# Patient Record
Sex: MALE | Race: OTHER | NOT HISPANIC OR LATINO | URBAN - METROPOLITAN AREA
[De-identification: names, ages, dates, MRNs, and addresses within clinical notes are randomized per-mention and may not be internally consistent; named-entity substitution may affect disease eponyms.]

---

## 2022-09-26 ENCOUNTER — INPATIENT (INPATIENT)
Facility: HOSPITAL | Age: 72
LOS: 3 days | Discharge: HOME | End: 2022-09-30
Attending: HOSPITALIST | Admitting: HOSPITALIST

## 2022-09-26 VITALS
DIASTOLIC BLOOD PRESSURE: 54 MMHG | RESPIRATION RATE: 18 BRPM | OXYGEN SATURATION: 99 % | SYSTOLIC BLOOD PRESSURE: 112 MMHG | HEART RATE: 96 BPM | TEMPERATURE: 98 F

## 2022-09-26 LAB
ALBUMIN SERPL ELPH-MCNC: 4.4 G/DL — SIGNIFICANT CHANGE UP (ref 3.3–5)
ALP SERPL-CCNC: 59 U/L — SIGNIFICANT CHANGE UP (ref 40–120)
ALT FLD-CCNC: 42 U/L — HIGH (ref 4–41)
ANION GAP SERPL CALC-SCNC: 15 MMOL/L — HIGH (ref 7–14)
APPEARANCE UR: CLEAR — SIGNIFICANT CHANGE UP
AST SERPL-CCNC: 32 U/L — SIGNIFICANT CHANGE UP (ref 4–40)
BASOPHILS # BLD AUTO: 0.01 K/UL — SIGNIFICANT CHANGE UP (ref 0–0.2)
BASOPHILS NFR BLD AUTO: 0.1 % — SIGNIFICANT CHANGE UP (ref 0–2)
BILIRUB SERPL-MCNC: 0.2 MG/DL — SIGNIFICANT CHANGE UP (ref 0.2–1.2)
BILIRUB UR-MCNC: NEGATIVE — SIGNIFICANT CHANGE UP
BUN SERPL-MCNC: 12 MG/DL — SIGNIFICANT CHANGE UP (ref 7–23)
CALCIUM SERPL-MCNC: 9.6 MG/DL — SIGNIFICANT CHANGE UP (ref 8.4–10.5)
CHLORIDE SERPL-SCNC: 96 MMOL/L — LOW (ref 98–107)
CO2 SERPL-SCNC: 23 MMOL/L — SIGNIFICANT CHANGE UP (ref 22–31)
COLOR SPEC: COLORLESS — SIGNIFICANT CHANGE UP
CREAT SERPL-MCNC: 0.9 MG/DL — SIGNIFICANT CHANGE UP (ref 0.5–1.3)
DIFF PNL FLD: NEGATIVE — SIGNIFICANT CHANGE UP
EGFR: 92 ML/MIN/1.73M2 — SIGNIFICANT CHANGE UP
EOSINOPHIL # BLD AUTO: 0 K/UL — SIGNIFICANT CHANGE UP (ref 0–0.5)
EOSINOPHIL NFR BLD AUTO: 0 % — SIGNIFICANT CHANGE UP (ref 0–6)
GLUCOSE SERPL-MCNC: 156 MG/DL — HIGH (ref 70–99)
GLUCOSE UR QL: NEGATIVE — SIGNIFICANT CHANGE UP
HCT VFR BLD CALC: 32.8 % — LOW (ref 39–50)
HGB BLD-MCNC: 10.8 G/DL — LOW (ref 13–17)
IANC: 9.71 K/UL — HIGH (ref 1.8–7.4)
IMM GRANULOCYTES NFR BLD AUTO: 1.7 % — HIGH (ref 0–0.9)
KETONES UR-MCNC: NEGATIVE — SIGNIFICANT CHANGE UP
LEUKOCYTE ESTERASE UR-ACNC: NEGATIVE — SIGNIFICANT CHANGE UP
LYMPHOCYTES # BLD AUTO: 1.51 K/UL — SIGNIFICANT CHANGE UP (ref 1–3.3)
LYMPHOCYTES # BLD AUTO: 12.7 % — LOW (ref 13–44)
MCHC RBC-ENTMCNC: 30 PG — SIGNIFICANT CHANGE UP (ref 27–34)
MCHC RBC-ENTMCNC: 32.9 GM/DL — SIGNIFICANT CHANGE UP (ref 32–36)
MCV RBC AUTO: 91.1 FL — SIGNIFICANT CHANGE UP (ref 80–100)
MONOCYTES # BLD AUTO: 0.45 K/UL — SIGNIFICANT CHANGE UP (ref 0–0.9)
MONOCYTES NFR BLD AUTO: 3.8 % — SIGNIFICANT CHANGE UP (ref 2–14)
NEUTROPHILS # BLD AUTO: 9.71 K/UL — HIGH (ref 1.8–7.4)
NEUTROPHILS NFR BLD AUTO: 81.7 % — HIGH (ref 43–77)
NITRITE UR-MCNC: NEGATIVE — SIGNIFICANT CHANGE UP
NRBC # BLD: 0 /100 WBCS — SIGNIFICANT CHANGE UP (ref 0–0)
NRBC # FLD: 0 K/UL — SIGNIFICANT CHANGE UP (ref 0–0)
PH UR: 6.5 — SIGNIFICANT CHANGE UP (ref 5–8)
PLATELET # BLD AUTO: 396 K/UL — SIGNIFICANT CHANGE UP (ref 150–400)
POTASSIUM SERPL-MCNC: 3.9 MMOL/L — SIGNIFICANT CHANGE UP (ref 3.5–5.3)
POTASSIUM SERPL-SCNC: 3.9 MMOL/L — SIGNIFICANT CHANGE UP (ref 3.5–5.3)
PROT SERPL-MCNC: 7.8 G/DL — SIGNIFICANT CHANGE UP (ref 6–8.3)
PROT UR-MCNC: NEGATIVE — SIGNIFICANT CHANGE UP
RBC # BLD: 3.6 M/UL — LOW (ref 4.2–5.8)
RBC # FLD: 14.6 % — HIGH (ref 10.3–14.5)
RBC CASTS # UR COMP ASSIST: SIGNIFICANT CHANGE UP /HPF (ref 0–4)
SODIUM SERPL-SCNC: 134 MMOL/L — LOW (ref 135–145)
SP GR SPEC: 1.01 — LOW (ref 1.01–1.05)
UROBILINOGEN FLD QL: SIGNIFICANT CHANGE UP
WBC # BLD: 11.88 K/UL — HIGH (ref 3.8–10.5)
WBC # FLD AUTO: 11.88 K/UL — HIGH (ref 3.8–10.5)
WBC UR QL: SIGNIFICANT CHANGE UP /HPF (ref 0–5)

## 2022-09-26 PROCEDURE — 74177 CT ABD & PELVIS W/CONTRAST: CPT | Mod: 26,MA

## 2022-09-26 PROCEDURE — 93010 ELECTROCARDIOGRAM REPORT: CPT

## 2022-09-26 PROCEDURE — 99285 EMERGENCY DEPT VISIT HI MDM: CPT | Mod: 25,GC

## 2022-09-26 NOTE — ED PROVIDER NOTE - OBJECTIVE STATEMENT
71 yo M hx of HTN, HLD, arrived from [facility] 10 days ago, pw weight loss (~18kg in the past month) and left flank pain x 1 month. Per CT report, patient has left renal cyst, unclear if infected based previous CT scan report done in August 20, 2022. Pt also endorse decreased urine ouput and "more yellow urine". Pt is concerned he has renal cancer. Denies GI symptoms, fever chills, dysuria. Of note, pt seen at [facility] on a few days ago and is being treated for PNA.

## 2022-09-26 NOTE — ED PROVIDER NOTE - PROGRESS NOTE DETAILS
PAYAL Mccall; Pt with L renal mass, possible malignancy, possibly infectious though less likely, Urology consulted, will assess.

## 2022-09-26 NOTE — ED PROVIDER NOTE - NS ED ROS FT
GENERAL: No fever, chills + weight loss unintentional.   EYES: no vision changes, no discharge.   ENT: no difficulty swallowing or speaking   CARDIAC: no chest pain/pressure, SOB, lower extremity swelling  PULMONARY: no cough, SOB  GI: no abdominal pain, n/v/d  : no dysuria, no hematuria  SKIN: no rashes, no ecchymosis  NEURO: no headache, lightheadedness  MSK: No joint pain, myalgia, weakness.

## 2022-09-26 NOTE — ED PROVIDER NOTE - CLINICAL SUMMARY MEDICAL DECISION MAKING FREE TEXT BOX
73 yo M hx of HTN, HLD, arrived from [facility] 10 days ago, pw unintentional weight loss and possible renal mass/cyst/ infection on CT done in [facility] in August. Well appearing on exam, no CVA ttp. Obtain labs, and CT to r/o infection/ abscess/ mass/ malignancy. Anticipate outpt followup for furhterworkup.

## 2022-09-26 NOTE — ED ADULT NURSE NOTE - OBJECTIVE STATEMENT
Patient received to room 17. Patient is a&ox4 ambulatory patient complaining of flank pain with no radiaition. patient denies chest pain sob n.Skyler granados has a IV in acon monitor awaiting for results.

## 2022-09-26 NOTE — ED ADULT NURSE NOTE - NSIMPLEMENTINTERV_GEN_ALL_ED
Implemented All Fall Risk Interventions:  [place] to call system. Call bell, personal items and telephone within reach. Instruct patient to call for assistance. Room bathroom lighting operational. Non-slip footwear when patient is off stretcher. Physically safe environment: no spills, clutter or unnecessary equipment. Stretcher in lowest position, wheels locked, appropriate side rails in place. Provide visual cue, wrist band, yellow gown, etc. Monitor gait and stability. Monitor for mental status changes and reorient to person, place, and time. Review medications for side effects contributing to fall risk. Reinforce activity limits and safety measures with patient and family.

## 2022-09-26 NOTE — ED PROVIDER NOTE - ATTENDING CONTRIBUTION TO CARE
Brief HPI:  71 yo M hx of HTN, HLD, arrived from [facility] 10 days ago, pw weight loss (~18kg in the past month) and left flank pain x 1 month.  Patient recently traveled from [facility].  States he was evaluated there for left flank pain with CT scan (has report from 8/4/22) showing left sided complex renal cyst, consistent with possible infected cyst.  Patient presents here for persistent pain and to establish care with specialist.  Denies fever, chills, hematuria, nausea, vomiting.  Reports dysuria.     Vitals:   Reviewed    Exam:    GEN:  Non-toxic appearing, non-distressed, speaking full sentences, non-diaphoretic, AAOx3  HEENT:  NCAT, neck supple, EOMI, PERRLA, sclera anicteric, no conjunctival pallor or injection, no stridor, normal voice, no tonsillar exudate, uvula midline  CV:  regular rhythm and rate, s1/s2 audible, no murmurs, rubs or gallops, peripheral pulses 2+ and symmetric  PULM:  non-labored respirations, lungs clear to auscultation bilaterally, no wheezes, crackles or rales  ABD:  non distended, non-tender, no rebound, no guarding, negative Zaragoza's sign, bowel sounds normal, no cvat  MSK:  no gross deformity, non-tender extremities and joints, range of motion grossly normal appearing, no extremity edema, extremities warm and well perfused   NEURO:  AAOx3, CN II-XII intact, motor 5/5 in upper and lower extremities bilaterally, sensation grossly intact in extremities and trunk, finger to nose testing wnl, no nystagmus, negative Romberg, no pronator drift, no gait deficit  SKIN:  warm, dry, no rash or vesicles     A/P:  71 yo M hx of HTN, HLD, arrived from [facility] 10 days ago, pw weight loss (~18kg in the past month) and left flank pain x 1 month.  VSS.  Will send labs, ua, ctap to evaluate left sided renal abnormality.  Dispo pending.

## 2022-09-27 DIAGNOSIS — D72.829 ELEVATED WHITE BLOOD CELL COUNT, UNSPECIFIED: ICD-10-CM

## 2022-09-27 DIAGNOSIS — N28.1 CYST OF KIDNEY, ACQUIRED: ICD-10-CM

## 2022-09-27 DIAGNOSIS — R55 SYNCOPE AND COLLAPSE: ICD-10-CM

## 2022-09-27 DIAGNOSIS — Z29.9 ENCOUNTER FOR PROPHYLACTIC MEASURES, UNSPECIFIED: ICD-10-CM

## 2022-09-27 DIAGNOSIS — E87.1 HYPO-OSMOLALITY AND HYPONATREMIA: ICD-10-CM

## 2022-09-27 DIAGNOSIS — Z78.9 OTHER SPECIFIED HEALTH STATUS: Chronic | ICD-10-CM

## 2022-09-27 DIAGNOSIS — I10 ESSENTIAL (PRIMARY) HYPERTENSION: ICD-10-CM

## 2022-09-27 DIAGNOSIS — E87.8 OTHER DISORDERS OF ELECTROLYTE AND FLUID BALANCE, NOT ELSEWHERE CLASSIFIED: ICD-10-CM

## 2022-09-27 DIAGNOSIS — E11.9 TYPE 2 DIABETES MELLITUS WITHOUT COMPLICATIONS: ICD-10-CM

## 2022-09-27 LAB
FLUAV AG NPH QL: SIGNIFICANT CHANGE UP
FLUBV AG NPH QL: SIGNIFICANT CHANGE UP
RSV RNA NPH QL NAA+NON-PROBE: SIGNIFICANT CHANGE UP
SARS-COV-2 RNA SPEC QL NAA+PROBE: SIGNIFICANT CHANGE UP
TROPONIN T, HIGH SENSITIVITY RESULT: 12 NG/L — SIGNIFICANT CHANGE UP
TSH SERPL-MCNC: 3.19 UIU/ML — SIGNIFICANT CHANGE UP (ref 0.27–4.2)

## 2022-09-27 PROCEDURE — 99223 1ST HOSP IP/OBS HIGH 75: CPT | Mod: FS

## 2022-09-27 RX ORDER — LOSARTAN POTASSIUM 100 MG/1
50 TABLET, FILM COATED ORAL DAILY
Refills: 0 | Status: DISCONTINUED | OUTPATIENT
Start: 2022-09-27 | End: 2022-09-30

## 2022-09-27 RX ORDER — ALBUTEROL 90 UG/1
2 AEROSOL, METERED ORAL EVERY 6 HOURS
Refills: 0 | Status: DISCONTINUED | OUTPATIENT
Start: 2022-09-27 | End: 2022-09-30

## 2022-09-27 RX ORDER — INSULIN LISPRO 100/ML
VIAL (ML) SUBCUTANEOUS AT BEDTIME
Refills: 0 | Status: DISCONTINUED | OUTPATIENT
Start: 2022-09-27 | End: 2022-09-30

## 2022-09-27 RX ORDER — DEXTROSE 50 % IN WATER 50 %
15 SYRINGE (ML) INTRAVENOUS ONCE
Refills: 0 | Status: DISCONTINUED | OUTPATIENT
Start: 2022-09-27 | End: 2022-09-30

## 2022-09-27 RX ORDER — DEXTROSE 50 % IN WATER 50 %
25 SYRINGE (ML) INTRAVENOUS ONCE
Refills: 0 | Status: DISCONTINUED | OUTPATIENT
Start: 2022-09-27 | End: 2022-09-30

## 2022-09-27 RX ORDER — INSULIN LISPRO 100/ML
VIAL (ML) SUBCUTANEOUS
Refills: 0 | Status: DISCONTINUED | OUTPATIENT
Start: 2022-09-27 | End: 2022-09-30

## 2022-09-27 RX ORDER — ENOXAPARIN SODIUM 100 MG/ML
40 INJECTION SUBCUTANEOUS EVERY 24 HOURS
Refills: 0 | Status: DISCONTINUED | OUTPATIENT
Start: 2022-09-27 | End: 2022-09-30

## 2022-09-27 RX ORDER — PANTOPRAZOLE SODIUM 20 MG/1
40 TABLET, DELAYED RELEASE ORAL
Refills: 0 | Status: DISCONTINUED | OUTPATIENT
Start: 2022-09-27 | End: 2022-09-30

## 2022-09-27 RX ORDER — DEXTROSE 50 % IN WATER 50 %
12.5 SYRINGE (ML) INTRAVENOUS ONCE
Refills: 0 | Status: DISCONTINUED | OUTPATIENT
Start: 2022-09-27 | End: 2022-09-30

## 2022-09-27 RX ORDER — SODIUM CHLORIDE 9 MG/ML
1000 INJECTION, SOLUTION INTRAVENOUS
Refills: 0 | Status: DISCONTINUED | OUTPATIENT
Start: 2022-09-27 | End: 2022-09-30

## 2022-09-27 RX ORDER — GLUCAGON INJECTION, SOLUTION 0.5 MG/.1ML
1 INJECTION, SOLUTION SUBCUTANEOUS ONCE
Refills: 0 | Status: DISCONTINUED | OUTPATIENT
Start: 2022-09-27 | End: 2022-09-30

## 2022-09-27 RX ORDER — LANOLIN ALCOHOL/MO/W.PET/CERES
3 CREAM (GRAM) TOPICAL AT BEDTIME
Refills: 0 | Status: DISCONTINUED | OUTPATIENT
Start: 2022-09-27 | End: 2022-09-30

## 2022-09-27 RX ORDER — MONTELUKAST 4 MG/1
10 TABLET, CHEWABLE ORAL DAILY
Refills: 0 | Status: DISCONTINUED | OUTPATIENT
Start: 2022-09-27 | End: 2022-09-30

## 2022-09-27 RX ORDER — ACETAMINOPHEN 500 MG
650 TABLET ORAL EVERY 6 HOURS
Refills: 0 | Status: DISCONTINUED | OUTPATIENT
Start: 2022-09-27 | End: 2022-09-30

## 2022-09-27 NOTE — H&P ADULT - NSICDXFAMILYHX_GEN_ALL_CORE_FT
FAMILY HISTORY:  Family history of cancer in sister, Lung CA -   Family history of diabetes mellitus in father

## 2022-09-27 NOTE — H&P ADULT - NS ATTEND AMEND GEN_ALL_CORE FT
72M with HTN, DM, known cystic kidney lesion, recently moved from [facility] presenting for evaluation of renal cyst, also reported a sycopal episode several days before admission.    Evaluated by urology who recommend MRI inpatient vs outpatient and outpatient follow-up.    For syncope - no prodrome, on exam no appreciable murmur, EKG unremarkable. Plan for telemetry, TTE, orthostatics    Recent admission for CAP, completed course of therapy and so will dc abx

## 2022-09-27 NOTE — H&P ADULT - NEGATIVE ENMT SYMPTOMS
no hearing difficulty/no ear pain/no tinnitus/no vertigo/no sinus symptoms/no nasal congestion/no abnormal taste sensation

## 2022-09-27 NOTE — H&P ADULT - GAIT/BALANCE
ambulated the patient 50 ft while attempting to weight the patient, ambulation was balanced no need for assistance, patient is slow to start but fine after

## 2022-09-27 NOTE — H&P ADULT - HISTORY OF PRESENT ILLNESS
72y Male PMHx HTN, HLD, DM, asthma presenting to [facility] ED for syncopal event and flank pain. He reports a 1 to 2 week episodes of dizziness that comes and goes and make it feel like he is spinning, states he has recently had reduced PO intake of food and water, feels that sitting down helps the dizziness and moving around too much makes the dizziness more apparent. On Sunday he reports an episode of 2 to 4 min LOC, with no pre syncopal symptoms, the patient was standing near the window and feel back on a carpeted floor hitting the back of his head, without an bruising or bleeding noted, he also denies vomiting or post fall confusion. He also reports a 1 month history of flank pain, non radiating 5/10 on pain scale, described as a sharp pinching that comes and goes. Additionally he reports a 2 month history of reduced urine output but increased frequency, he states that he feels he doesn't produce as much urine as baseline but has noted increased frequency and the color is a light yellow, he feels that he hasn't consumed enough fluids recently and attributes it to that. He also reports a 15kg weight loss and has a recently travel history significant for a migration from [facility] 10 days ago. Otherwise denies fever, headache, numbess/tingling, photophobia, difficulty hearing, orthopnea, chest pain, palpations, melena, hematochezia, hematuria.  72y Male PMHx HTN, HLD, DM, asthma presenting to [facility] ED for syncopal event and flank pain. He reports a 1 to 2 week episodes of dizziness that comes and goes and make it feel like he is spinning, states he has recently had reduced PO intake of food and water, feels that sitting down helps the dizziness and moving around too much makes the dizziness more apparent. On Sunday he reports an episode of 2 to 4 min LOC, with shortness of breath prior to event, the patient was standing near the window and feel back on a carpeted floor hitting the back of his head, without an bruising or bleeding noted, he also denies vomiting or post fall confusion. He also reports a 1 month history of flank pain, non radiating 5/10 on pain scale, described as a sharp pinching that comes and goes. Additionally he reports a 2 month history of reduced urine output but increased frequency, he states that he feels he doesn't produce as much urine as baseline but has noted increased frequency and the color is a light yellow, he feels that he hasn't consumed enough fluids recently and attributes it to that. He also reports a 15kg weight loss and has a recently travel history significant for a migration from [facility] 10 days ago. Otherwise denies fever, headache, numbess/tingling, photophobia, difficulty hearing, orthopnea, chest pain, palpations, melena, hematochezia, hematuria, urinary incontinence, tremor or shaking, tongue biting.     Of note on 9/22 he went to [facility] ER and was evaluted for cough/sob and was found to have a pneumonia; prescribed prednisone, Augmentin and azithromycin.

## 2022-09-27 NOTE — H&P ADULT - NEGATIVE OPHTHALMOLOGIC SYMPTOMS
no diplopia/no photophobia/no blurred vision L/no blurred vision R/no irritation L/no irritation R/no loss of vision L/no loss of vision R

## 2022-09-27 NOTE — H&P ADULT - RESPIRATORY
clear to auscultation bilaterally/no wheezes/no rales/no rhonchi/no respiratory distress/no use of accessory muscles/airway patent/breath sounds equal/good air movement

## 2022-09-27 NOTE — H&P ADULT - PROBLEM SELECTOR PLAN 1
-admitted to medicine: For near syncope/complex renal mass  - reported 1 to 2 week dizzy spells and 2-4 LOC on sunday, no hematoma, didn't bite lip or urinate on themselves, standing fell to carpeted ground hitting the back of his head - Admit to medicine on telemetry  - Patient with syncopal event with questionable duration of LOC that was preceded by SOB.   - EKG Non-ischemic appearing and troponin negative  - Patient without tachycardia or hypoxia though had recent flight - doubt PE  - Will check orthostatics and echocardiogram

## 2022-09-27 NOTE — H&P ADULT - PROBLEM SELECTOR PLAN 4
- Prophylaxis: Lovenox 40 (patient had recently been on long flight) - Very mild hyponatremia likely mixed picture of hyperglycemia and volume 2/2 pneumonia.   - Monitor BMP

## 2022-09-27 NOTE — ED ADULT NURSE REASSESSMENT NOTE - NS ED NURSE REASSESS COMMENT FT1
received report form night RN deepak. VSS, denies chest pain and SOB, RR even and unlabored. pt denies pain at this time. will continue to monitor.

## 2022-09-27 NOTE — H&P ADULT - PROBLEM SELECTOR PLAN 6
- Awaiting pharmcy aid in transalating patients medications  - For now can start losartan 50  - Dash diet

## 2022-09-27 NOTE — H&P ADULT - NSHPLABSRESULTS_GEN_ALL_CORE
10.8   11.88 )-----------( 396      ( 26 Sep 2022 17:36 )             32.8           134<L>  |  96<L>  |  12  ----------------------------<  156<H>  3.9   |  23  |  0.90    Ca    9.6      26 Sep 2022 17:36    TPro  7.8  /  Alb  4.4  /  TBili  0.2  /  DBili  x   /  AST  32  /  ALT  42<H>  /  AlkPhos  59      Urinalysis Basic - ( 26 Sep 2022 21:10 )    Color: Colorless / Appearance: Clear / S.008 / pH: x  Gluc: x / Ketone: Negative  / Bili: Negative / Urobili: <2 mg/dL   Blood: x / Protein: Negative / Nitrite: Negative   Leuk Esterase: Negative / RBC: NA /HPF / WBC NA /HPF   Sq Epi: x / Non Sq Epi: x / Bacteria: x    CT A/P: 1. 5 cm complex cystic lesion in the lower pole left kidney,   indeterminate. MRI with contrast would be helpful to definitively   characterize this cystic mass.  2. Tree-in-bud opacities in the right lower lobe, in keeping with   patient's history of recently diagnosed pneumonia.  3. Mild aneurysmal dilatation of the distal descending aorta above the hiatus, measures up to 3.8 cm.

## 2022-09-27 NOTE — H&P ADULT - ASSESSMENT
72y Male PMHx HTN, HLD, DM, asthma presenting to [facility] ED for syncopal event and flank pain, now admitted to medicine for eval of left kidney mass and syncopal event.

## 2022-09-27 NOTE — PHARMACOTHERAPY INTERVENTION NOTE - COMMENTS
Medication history is incomplete. Medication list updated in Outpatient Medication Record (OMR) per [facility].     International medications from patient’s applicable medical records:    Prescription note dated 6/21/22  Provair 10mg = Montelukast 10mg (formulary)  Cefaclav 500mg = Cefuroxime axetil 500mg + Clavulanic acid 125mg  Not available as combination in [place], cefuroxime axetil alone 500mg tablet on formulary (may no longer be necessary)  Predixa 16mg = Methylprednisolone 16mg (formulary)  Pantonix 40mg = Pantoprazole 40mg (formulary)  Doxiva 200mg = Doxophylline 200mg  Not available in the [place], however this is a theophylline derivative; theophylline on formulary   Dilbro UniCap = Indacaterol (long-acting beta-2 agonist) + Glycopyrronium (long-acting anticholinergic)  Not available in [place], formulary recommendations for LABA + Anticholinergic include Bevespi Aerosphere or Stiolto Respirmat  Bexitrol F 25/250 = Salmeterol (long-acting beta-2 agonist) + Fluticasone propionate (inhaled corticosteroid)  Therapeutic duplication of long-acting beta-2 agonist class with item #6  Not available in [place], formulary recommendations for LABA + inhaled corticosteroid + Anticholinergic include Bevespi Aerosphere OR Stiolto Respirmat + Asmanex OR Pulmicort  Azmasol HFA = international brand name for albuterol HFA (formulary)  Short acting beta-agonist  Fluvate nasal spray = Fluticasone nasal spray 50mcg/spray (formulary)  Banocide 50mg = Diethylcarbamazine 50mg  Not available in [place], this is an anthelmintic medication for treating parasitic worm infections and filariasis. Patient may no longer need this.    Medical note dated 6/21/22   Siglimet 50-500mg once in the morning = combination tablet Sitagliptin 50mg + Metformin 500mg  Oral antidiabetics not used inpatient, use basal/bolus insulin regimen  Comet 850mg once in the evening = Metformin 850mg  Oral antidiabetics not used inpatient, use basal/bolus insulin regimen  Telmipres 40mg once daily = Telmisartan 40mg   Therapeutic interchange to losartan 50mg orally once daily  Prelica 50mg orally once at bedtime = Pregabalin 50mg (formulary)  D-Vine 40,000 units (frequency not indicated) = Vitamin D3 capsule 40,000 units  Bilastin 20mg orally 2 times a day = bilastine 20mg  Not available in the [place]; this is an  antihistamine, loratadine (non-drowsy) or diphenhydramine (administer at bedtime) on formulary  Zif-forte = Vitamin B complete with iron, folic acid, and zinc  Dexilend 30mg orally 2 times a day = dexlansoprazole 30mg  Therapeutic duplication with item #4 in Prescription note dated 6/21/22, above, pantoprazole on formulary  Flutisal 250 = Salmeterol (long-acting beta-2 agonist) + Fluticasone propionate (inhaled corticosteroid)  See item #7b from Prescription note dated 6/21/22, above, for formulary recommendation  Solas 100mg = Mebendazole 100mg   This is an broad-spectrum anthelmintic medication for treating parasitic worm infections and filariasis. Patient may no longer need this.    Medical note dated 8/16/22  Siglimet 50-500mg – see above  Empazim 10mg = Empagliflozin 10mg (Jardiance)  Oral antidiabetics not used inpatient, use basal/bolus insulin regimen  Temipres 40mg – see above  Degut (difficult to read hand-writing) = Domperidone 10mg (usually 3 times a day)  Not available in [place], this is a dopamine antagonist and prokinetic/gastrointestinal agent for GI motility disorders (diabetic gastroparesis, gastritis)  No available alternatives on formulary or in [place]  Xinc B = Vitamin B complex with zinc    Please call spectra [phone] if you have any questions.  Medication history is incomplete. Medication list updated in Outpatient Medication Record (OMR) per [facility].     International medications from patient’s applicable medical records:    Prescription note dated 6/21/22  Provair 10mg = Montelukast 10mg (formulary)  Cefaclav 500mg = Cefuroxime axetil 500mg + Clavulanic acid 125mg  Not available as combination in [place], cefuroxime axetil alone 500mg tablet on formulary (may no longer be necessary)  Predixa 16mg = Methylprednisolone 16mg (formulary)  Pantonix 40mg = Pantoprazole 40mg (formulary)  Doxiva 200mg = Doxophylline 200mg  Not available in the [place], however this is a theophylline derivative; theophylline on formulary   Dilbro UniCap = Indacaterol (long-acting beta-2 agonist) + Glycopyrronium (long-acting anticholinergic)  Not available in [place], formulary recommendations for LABA + Anticholinergic include Bevespi Aerosphere or Stiolto Respirmat  Bexitrol F 25/250 = Salmeterol (long-acting beta-2 agonist) + Fluticasone propionate (inhaled corticosteroid)  Therapeutic duplication of long-acting beta-2 agonist class with item #6  Not available in [place], formulary recommendations for LABA + inhaled corticosteroid + Anticholinergic include Bevespi Aerosphere OR Stiolto Respirmat + Asmanex OR Pulmicort  Azmasol HFA = international brand name for albuterol HFA (formulary)  Short acting beta-agonist  Fluvate nasal spray = Fluticasone nasal spray 50mcg/spray (formulary)  Banocide 50mg = Diethylcarbamazine 50mg  Not available in [place], this is an anthelmintic medication for treating parasitic worm infections and filariasis. Patient may no longer need this.    Medical note dated 6/21/22   Siglimet 50-500mg once in the morning = combination tablet Sitagliptin 50mg + Metformin 500mg  Oral antidiabetics not used inpatient, use basal/bolus insulin regimen  Comet 850mg once in the evening = Metformin 850mg  Oral antidiabetics not used inpatient, use basal/bolus insulin regimen  Telmipres 40mg once daily = Telmisartan 40mg   Therapeutic interchange to losartan 50mg orally once daily  Prelica 50mg orally once at bedtime = Pregabalin 50mg (formulary)  D-Vine 40,000 units (frequency not indicated) = Vitamin D3 capsule 40,000 units  Bilastin 20mg orally 2 times a day = bilastine 20mg  Not available in the [place]; this is an  antihistamine, loratadine (non-drowsy) or diphenhydramine (administer at bedtime) on formulary  Zif-forte = Vitamin B complete with iron, folic acid, and zinc  Dexilend 30mg orally 2 times a day = dexlansoprazole 30mg  Therapeutic duplication with item #4 in Prescription note dated 6/21/22, above, pantoprazole on formulary  Flutisal 250 = Salmeterol (long-acting beta-2 agonist) + Fluticasone propionate (inhaled corticosteroid)  See item #7b from Prescription note dated 6/21/22, above, for formulary recommendation  Solas 100mg = Mebendazole 100mg   This is an broad-spectrum anthelmintic medication for treating parasitic worm infections and filariasis. Patient may no longer need this.    Medical note dated 8/16/22  Siglimet 50-500mg – see above  Empazim 10mg = Empagliflozin 10mg (Jardiance)  Oral antidiabetics not used inpatient, use basal/bolus insulin regimen  Temipres 40mg – see above  Degut (difficult to read hand-writing) = Domperidone 10mg (usually 3 times a day)  Not available in [place], this is a dopamine antagonist and prokinetic/gastrointestinal agent for GI motility disorders (diabetic gastroparesis, gastritis)  No available alternatives on formulary or in [place]  Xinc B = Vitamin B complex with zinc    Please call spectra [phone] if you have any questions.  Spoke with ACP provider to notify OMR updated.

## 2022-09-27 NOTE — H&P ADULT - PROBLEM SELECTOR PLAN 3
- recent diagnosis of CAP @ [facility] on 9/22  - being treated with Augmentin, Azithromycin, Prednisone   - As per [facility] d/c paper work WBC 13.05 on 9/22  - Currently 11.88 (resolving)  - continue to monitor with CBC  - start D5W 100cc/hr - recent diagnosis of CAP @ [facility] on 9/22  - being treated with Augmentin, Azithromycin, Prednisone   - As per [facility] d/c paper work WBC 13.05 on 9/22  - Currently 11.88 (resolving)  - continue to monitor with CBC  -  Complete course of Augmentin through 10/1 as previously prescribed. - recent diagnosis of CAP @ [facility] on 9/22  - being treated with Augmentin, Azithromycin, Prednisone   - As per [facility] d/c paper work WBC 13.05 on 9/22  - Currently 11.88 (resolving)  - continue to monitor with CBC  - d/c augmentin given has already completed >5 days for CAP

## 2022-09-27 NOTE — CONSULT NOTE ADULT - ASSESSMENT
71 y/o male from [facility] presented with left flank pain x 1 month and weight loss of 15kg, with no fevers, dysuria or hematuria. CT findings show 5cm complex cystic lesion in lower pole of left kidney.

## 2022-09-27 NOTE — CONSULT NOTE ADULT - SUBJECTIVE AND OBJECTIVE BOX
HPI  73 y/o male w/ PMHx of HTN, DM, Asthma, presents to ED with left sided flank pain x 1 month. He describes the pain as intermittent and sharp, relieved with resting. Patient reports temperature of 99-100F. Patient reports a syncopal episode with LOC on . He admits to dizziness and weakness. He denies headache, CP, dyspnea, n/v, dysuria or hematuria.    Of note, patient moved from [facility] on 22. Due to weight loss of 15kg, weakness, and decreased urine output, patient had CT scan on 08/10/22, which was significant for high density fluid-filled lesion (4.5 x 3.5 x 4.5 cm) in left kidney; possibly infected cyst, possible abscess.  No intervention was indicated at that time, as [place] was coming [place].      PAST MEDICAL & SURGICAL HISTORY:  HTN  DM  Asthma    MEDICATIONS  (STANDING):   Comet 500mg   Telmipress 40mg    MEDICATIONS  (PRN):      FAMILY HISTORY:  Sister - Lung cancer  Mom - DM      Allergies    No Known Allergies    Intolerances        SOCIAL HISTORY:   Quit smoking 15 yrs ago, smoked 3 PPD x 35yrs    REVIEW OF SYSTEMS: Otherwise negative as stated in HPI    Physical Exam  Vital signs  T(F): 98 (22 @ 05:00), Max: 98.3 (22 @ 00:55)  HR: 69 (22 @ 05:00)  BP: 134/62 (22 @ 05:00)  SpO2: 98% (22 @ 05:00)    Output    UOP    Gen:  [X] NAD [] toxic    Pulm:  [X] no resp distress [X] no substernal retractions  	  CV:  [X] RRR    GI:  [X] Soft [X] ND [X] NT  No CVA tenderness bilaterally                        	  MSK:  Edema []Y [X]N    LABS:       @ 17:36    WBC 11.88 / Hct 32.8  / SCr 0.90       Urinalysis Basic - ( 26 Sep 2022 21:10 )    Color: Colorless / Appearance: Clear / S.008 / pH: x  Gluc: x / Ketone: Negative  / Bili: Negative / Urobili: <2 mg/dL   Blood: x / Protein: Negative / Nitrite: Negative   Leuk Esterase: Negative / RBC: NA /HPF / WBC NA /HPF   Sq Epi: x / Non Sq Epi: x / Bacteria: x        Urine Cx: P  Blood Cx:    RADIOLOGY:     < from: CT Abdomen and Pelvis w/ IV Cont (22 @ 23:39) >    ACC: [id number] EXAM:  CT ABDOMEN AND PELVIS IC                          PROCEDURE DATE:  2022          INTERPRETATION:  CLINICAL INFORMATION: Unintentional weight loss, left   flank pain for one month, currently on antibiotics for pneumonia.    COMPARISON: None.    CONTRAST/COMPLICATIONS:  IV Contrast: Omnipaque 350  62 cc administered   8 cc discarded  Oral Contrast: NONE  Complications: None reported at time of study completion    PROCEDURE:  CT of the Abdomen and Pelvis was performed.  Sagittal and coronal reformats were performed.    FINDINGS:  LOWER CHEST: Subtle tree-in-bud infiltrate in the right lower lobe. No   effusion. Distal descending thoracic aorta is focally aneurysmal   measuring 3.8 x 3.6 cm above the hiatus.    LIVER:Hepatic steatosis with some areas of sparing peripherally  BILE DUCTS: Normal caliber.  GALLBLADDER: Within normal limits.  SPLEEN: Within normal limits.  PANCREAS: Within normal limits.  ADRENALS: Within normal limits.  KIDNEYS/URETERS: 5 cm mildly complex cystic lesion in the lower pole left   kidney, extends into the region of the lower pole calyx, with several   punctate peripheral calcifications. Mild fat stranding along the inferior   margin. No hydronephrosis or hydroureter.    BLADDER: Unremarkable  REPRODUCTIVE ORGANS: Prostate is normal in size    BOWEL: No bowel obstruction. Normal appendix.Colonic diverticulosis  PERITONEUM: No free air or free fluid.  VESSELS: Atherosclerotic changes.  RETROPERITONEUM/LYMPH NODES: No enlarged lymph nodes measuring greater   than 10 mm in short axis  ABDOMINAL WALL: Unremarkable  BONES: No destructive osseous lesion    IMPRESSION:    1. 5 cm complex cystic lesion in the lower pole left kidney,   indeterminate. MRI with contrast would be helpful to definitively   characterize this cystic mass.  2. Tree-in-bud opacities in the right lower lobe, in keeping with   patient's history of recently diagnosed pneumonia.  3. Mild aneurysmal dilatation of the distal descending aorta above the   hiatus, measures up to 3.8 cm.    < end of copied text >

## 2022-09-27 NOTE — H&P ADULT - PROBLEM SELECTOR PLAN 7
- Prophylaxis: Lovenox 40     Med-history pharmacist emailed for outpatient medication review as medications are international names.

## 2022-09-27 NOTE — H&P ADULT - GASTROINTESTINAL
soft/nontender/nondistended/normal active bowel sounds/no guarding/no rigidity/no organomegaly details…

## 2022-09-27 NOTE — H&P ADULT - PROBLEM SELECTOR PLAN 2
- CT A/P: 5cm Complex cystic lesion in the left lower pole of kidney, Mild aneurysmal dilation of the distal descending aorta above the hiatus   - UA negative, UC sent   - Consulted urology recommendations appreciated   - Pain management: acetaminophen  - MRI Abdomen ordered  - Follow up as outpatient on d/c - CT A/P: 5cm Complex cystic lesion in the left lower pole of kidney, Mild aneurysmal dilation of the distal descending aorta above the hiatus   - Consulted urology recommendations appreciated - further evaluation can be completed as outpatient and should not delay discharge.   - Pain management: acetaminophen  - MRI Abdomen ordered (safety sheet and consent obtained and placed in chart)  - Follow up as outpatient on d/c

## 2022-09-28 DIAGNOSIS — I71.4 ABDOMINAL AORTIC ANEURYSM, WITHOUT RUPTURE: ICD-10-CM

## 2022-09-28 DIAGNOSIS — E53.8 DEFICIENCY OF OTHER SPECIFIED B GROUP VITAMINS: ICD-10-CM

## 2022-09-28 LAB
A1C WITH ESTIMATED AVERAGE GLUCOSE RESULT: 6.7 % — HIGH (ref 4–5.6)
ANION GAP SERPL CALC-SCNC: 13 MMOL/L — SIGNIFICANT CHANGE UP (ref 7–14)
BUN SERPL-MCNC: 12 MG/DL — SIGNIFICANT CHANGE UP (ref 7–23)
CALCIUM SERPL-MCNC: 9 MG/DL — SIGNIFICANT CHANGE UP (ref 8.4–10.5)
CHLORIDE SERPL-SCNC: 100 MMOL/L — SIGNIFICANT CHANGE UP (ref 98–107)
CHOLEST SERPL-MCNC: 184 MG/DL — SIGNIFICANT CHANGE UP
CO2 SERPL-SCNC: 24 MMOL/L — SIGNIFICANT CHANGE UP (ref 22–31)
CREAT SERPL-MCNC: 0.77 MG/DL — SIGNIFICANT CHANGE UP (ref 0.5–1.3)
CULTURE RESULTS: NO GROWTH — SIGNIFICANT CHANGE UP
EGFR: 96 ML/MIN/1.73M2 — SIGNIFICANT CHANGE UP
ESTIMATED AVERAGE GLUCOSE: 146 — SIGNIFICANT CHANGE UP
FERRITIN SERPL-MCNC: 132 NG/ML — SIGNIFICANT CHANGE UP (ref 30–400)
FOLATE SERPL-MCNC: 9.4 NG/ML — SIGNIFICANT CHANGE UP (ref 3.1–17.5)
GLUCOSE BLDC GLUCOMTR-MCNC: 113 MG/DL — HIGH (ref 70–99)
GLUCOSE BLDC GLUCOMTR-MCNC: 123 MG/DL — HIGH (ref 70–99)
GLUCOSE BLDC GLUCOMTR-MCNC: 172 MG/DL — HIGH (ref 70–99)
GLUCOSE SERPL-MCNC: 102 MG/DL — HIGH (ref 70–99)
HCT VFR BLD CALC: 34.9 % — LOW (ref 39–50)
HCV AB S/CO SERPL IA: 0.13 S/CO — SIGNIFICANT CHANGE UP (ref 0–0.99)
HCV AB SERPL-IMP: SIGNIFICANT CHANGE UP
HDLC SERPL-MCNC: 33 MG/DL — LOW
HGB BLD-MCNC: 10.9 G/DL — LOW (ref 13–17)
IRON SATN MFR SERPL: 22 % — SIGNIFICANT CHANGE UP (ref 14–50)
IRON SATN MFR SERPL: 45 UG/DL — SIGNIFICANT CHANGE UP (ref 45–165)
LIPID PNL WITH DIRECT LDL SERPL: 93 MG/DL — SIGNIFICANT CHANGE UP
MAGNESIUM SERPL-MCNC: 1.7 MG/DL — SIGNIFICANT CHANGE UP (ref 1.6–2.6)
MCHC RBC-ENTMCNC: 29.1 PG — SIGNIFICANT CHANGE UP (ref 27–34)
MCHC RBC-ENTMCNC: 31.2 GM/DL — LOW (ref 32–36)
MCV RBC AUTO: 93.3 FL — SIGNIFICANT CHANGE UP (ref 80–100)
NON HDL CHOLESTEROL: 151 MG/DL — HIGH
NRBC # BLD: 0 /100 WBCS — SIGNIFICANT CHANGE UP (ref 0–0)
NRBC # FLD: 0 K/UL — SIGNIFICANT CHANGE UP (ref 0–0)
PHOSPHATE SERPL-MCNC: 4.1 MG/DL — SIGNIFICANT CHANGE UP (ref 2.5–4.5)
PLATELET # BLD AUTO: 373 K/UL — SIGNIFICANT CHANGE UP (ref 150–400)
POTASSIUM SERPL-MCNC: 3.8 MMOL/L — SIGNIFICANT CHANGE UP (ref 3.5–5.3)
POTASSIUM SERPL-SCNC: 3.8 MMOL/L — SIGNIFICANT CHANGE UP (ref 3.5–5.3)
RBC # BLD: 3.74 M/UL — LOW (ref 4.2–5.8)
RBC # FLD: 14.6 % — HIGH (ref 10.3–14.5)
SODIUM SERPL-SCNC: 137 MMOL/L — SIGNIFICANT CHANGE UP (ref 135–145)
SPECIMEN SOURCE: SIGNIFICANT CHANGE UP
TIBC SERPL-MCNC: 208 UG/DL — LOW (ref 220–430)
TRIGL SERPL-MCNC: 290 MG/DL — HIGH
UIBC SERPL-MCNC: 163 UG/DL — SIGNIFICANT CHANGE UP (ref 110–370)
VIT B12 SERPL-MCNC: 167 PG/ML — LOW (ref 200–900)
WBC # BLD: 10.79 K/UL — HIGH (ref 3.8–10.5)
WBC # FLD AUTO: 10.79 K/UL — HIGH (ref 3.8–10.5)

## 2022-09-28 PROCEDURE — 99233 SBSQ HOSP IP/OBS HIGH 50: CPT

## 2022-09-28 PROCEDURE — 74183 MRI ABD W/O CNTR FLWD CNTR: CPT | Mod: 26

## 2022-09-28 RX ORDER — BUDESONIDE AND FORMOTEROL FUMARATE DIHYDRATE 160; 4.5 UG/1; UG/1
2 AEROSOL RESPIRATORY (INHALATION)
Refills: 0 | Status: DISCONTINUED | OUTPATIENT
Start: 2022-09-28 | End: 2022-09-30

## 2022-09-28 RX ORDER — PREGABALIN 225 MG/1
1000 CAPSULE ORAL DAILY
Refills: 0 | Status: DISCONTINUED | OUTPATIENT
Start: 2022-09-28 | End: 2022-09-30

## 2022-09-28 RX ORDER — MAGNESIUM SULFATE 500 MG/ML
2 VIAL (ML) INJECTION ONCE
Refills: 0 | Status: COMPLETED | OUTPATIENT
Start: 2022-09-28 | End: 2022-09-28

## 2022-09-28 RX ORDER — INFLUENZA VIRUS VACCINE 15; 15; 15; 15 UG/.5ML; UG/.5ML; UG/.5ML; UG/.5ML
0.7 SUSPENSION INTRAMUSCULAR ONCE
Refills: 0 | Status: DISCONTINUED | OUTPATIENT
Start: 2022-09-28 | End: 2022-09-30

## 2022-09-28 RX ADMIN — Medication 25 GRAM(S): at 18:19

## 2022-09-28 RX ADMIN — PANTOPRAZOLE SODIUM 40 MILLIGRAM(S): 20 TABLET, DELAYED RELEASE ORAL at 06:32

## 2022-09-28 RX ADMIN — LOSARTAN POTASSIUM 50 MILLIGRAM(S): 100 TABLET, FILM COATED ORAL at 06:32

## 2022-09-28 RX ADMIN — PREGABALIN 1000 MICROGRAM(S): 225 CAPSULE ORAL at 11:58

## 2022-09-28 RX ADMIN — BUDESONIDE AND FORMOTEROL FUMARATE DIHYDRATE 2 PUFF(S): 160; 4.5 AEROSOL RESPIRATORY (INHALATION) at 22:14

## 2022-09-28 RX ADMIN — ENOXAPARIN SODIUM 40 MILLIGRAM(S): 100 INJECTION SUBCUTANEOUS at 06:32

## 2022-09-28 RX ADMIN — MONTELUKAST 10 MILLIGRAM(S): 4 TABLET, CHEWABLE ORAL at 11:58

## 2022-09-28 NOTE — PATIENT PROFILE ADULT - FUNCTIONAL SCREEN CURRENT LEVEL: COMMUNICATION, MLM
[facility] Admission Note


Date of Admission


2020 at 20:58





History


This is a term female born at 39-6/7 weeks of gestational age via spontaneous 

vaginal delivery to a 22-year-old  (G) 3 para (P) now 3  mother who is 

blood type B+, hepatitis B negative, rapid plasma reagin (RPR) negative, HIV 

negative, group B Streptococcus positive. Mother was treated with penicillin 

during labor for group B strep prophylaxis. Rupture of membranes 23 hours and 18

minutes prior to delivery. Cord around neck noted to be present. Apgar scores 

were 9 at one minute and 9 at five minutes. Baby was admitted to the Mother-Baby

unit.





Physical Examination


Physical Measurements


On admission, the baby's weight is 3810 grams which is 8 pounds and 6 ounces, 

length is 19-1/2 inches, and head circumference is 13-1/2 inches.


Vital Signs





Vital Signs








  Date Time  Temp Pulse Resp B/P (MAP) Pulse Ox O2 Delivery O2 Flow Rate FiO2


 


20 21:10  150 52     


 


20 21:57 98.0   73/33 (46)  Room Air  








General:  Positive: Active, Other (appropriately responsive); 


   Negative: Dysmorphic Features


HEENT:  Positive: Normocephalic, Anterior [place] Open, Positive Red Reflexes

Efraín, Other (small right preauricular skin tag)


Heart:  Positive: S1,S2, Other (irregular heart rhythm with occasional skipped 

beat); 


   Negative: Murmur


Lungs:  Positive: Good Bilateral Air Entry; 


   Negative: Grunting and Retractions


Abdomen:  Positive: Soft; 


   Negative: Distended


Female Genitalia:  Positive: Normal Term Genitalia


Extremities:  Positive: Other (both hips stable with normal Ortolani and Kaur 

maneuvers)


Skin:  Positive: Normal for Gestation, Normal Capillary Refill


Neurological:  POSITIVE: Good Tone, Positive Rony Reflex





Asessment


Problems:  


(1) Healthy female 


Problem Text:  No clinical signs of group B strep infection. Heart rhythm is 

irregular with occasional skipped beat. No sustained tachycardia or bradycardia.








Plan


1. Admit to mother-baby unit.


2. Routine  care.


3. Both parents updated on condition and plan for the baby.











Glen Jackson MD                   2020 20:36 0 = understands/communicates without difficulty

## 2022-09-28 NOTE — PATIENT PROFILE ADULT - FALL HARM RISK - HARM RISK INTERVENTIONS
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
## 2022-09-28 NOTE — PROGRESS NOTE ADULT - SUBJECTIVE AND OBJECTIVE BOX
Dr. Neetu Milligan  Hospitalist  pager [phone]    Patient is a 72y old  Male who presents with a chief complaint of Syncope (27 Sep 2022 13:06)    SUBJECTIVE / OVERNIGHT EVENTS: Patient seen and examined. Standing at side of the bed. Denies any dizziness for last 2 days. No chest, abdominal or flank pain. Reports he eats a vegetarian diet. No new complaints. Understands that he is awaiting TTE and MRI     MEDICATIONS  (STANDING):  cyanocobalamin 1000 MICROGram(s) Oral daily  dextrose 5%. 1000 milliLiter(s) (100 mL/Hr) IV Continuous <Continuous>  dextrose 5%. 1000 milliLiter(s) (50 mL/Hr) IV Continuous <Continuous>  dextrose 50% Injectable 25 Gram(s) IV Push once  dextrose 50% Injectable 12.5 Gram(s) IV Push once  dextrose 50% Injectable 25 Gram(s) IV Push once  enoxaparin Injectable 40 milliGRAM(s) SubCutaneous every 24 hours  glucagon  Injectable 1 milliGRAM(s) IntraMuscular once  influenza  Vaccine (HIGH DOSE) 0.7 milliLiter(s) IntraMuscular once  insulin lispro (ADMELOG) corrective regimen sliding scale   SubCutaneous three times a day before meals  insulin lispro (ADMELOG) corrective regimen sliding scale   SubCutaneous at bedtime  losartan 50 milliGRAM(s) Oral daily  magnesium sulfate  IVPB 2 Gram(s) IV Intermittent once  montelukast 10 milliGRAM(s) Oral daily  pantoprazole    Tablet 40 milliGRAM(s) Oral before breakfast    MEDICATIONS  (PRN):  acetaminophen     Tablet .. 650 milliGRAM(s) Oral every 6 hours PRN Temp greater or equal to 38C (100.4F), Mild Pain (1 - 3)  ALBUTerol    90 MICROgram(s) HFA Inhaler 2 Puff(s) Inhalation every 6 hours PRN Shortness of Breath and/or Wheezing  dextrose Oral Gel 15 Gram(s) Oral once PRN Blood Glucose LESS THAN 70 milliGRAM(s)/deciliter  melatonin 3 milliGRAM(s) Oral at bedtime PRN Insomnia        CAPILLARY BLOOD GLUCOSE  POCT Blood Glucose.: 109 mg/dL (28 Sep 2022 08:46)  POCT Blood Glucose.: 97 mg/dL (27 Sep 2022 21:34)  POCT Blood Glucose.: 95 mg/dL (27 Sep 2022 17:14)  POCT Blood Glucose.: 95 mg/dL (27 Sep 2022 14:37)    Vital Signs Last 24 Hrs  T(C): 36.8 (28 Sep 2022 11:23), Max: 36.9 (27 Sep 2022 19:49)  T(F): 98.2 (28 Sep 2022 11:23), Max: 98.4 (27 Sep 2022 19:49)  HR: 96 (28 Sep 2022 11:23) (73 - 96)  BP: 123/72 (28 Sep 2022 11:23) (102/57 - 128/66)  BP(mean): --  RR: 17 (28 Sep 2022 11:23) (16 - 18)  SpO2: 100% (28 Sep 2022 11:23) (100% - 100%)    Parameters below as of 28 Sep 2022 11:23  Patient On (Oxygen Delivery Method): room air        PHYSICAL EXAM:  GENERAL: NAD, well-developed  HEAD:  Atraumatic, Normocephalic  EYES: EOMI, PERRLA, conjunctiva and sclera clear  NECK: Supple, No JVD  CHEST/LUNG: Clear to auscultation bilaterally; No wheeze  HEART: Regular rate and rhythm; No murmurs, rubs, or gallops  ABDOMEN: Soft, Nontender, Nondistended; Bowel sounds present  EXTREMITIES:  2+ Peripheral Pulses, No clubbing, cyanosis, or edema  PSYCH: AAOx3  NEUROLOGY: non-focal  SKIN: No rashes or lesions    LABS:                        10.9   10.79 )-----------( 373      ( 28 Sep 2022 06:00 )             34.9     09-28    137  |  100  |  12  ----------------------------<  102<H>  3.8   |  24  |  0.77    Ca    9.0      28 Sep 2022 06:00  Phos  4.1     -  Mg     1.70     -    TPro  7.8  /  Alb  4.4  /  TBili  0.2  /  DBili  x   /  AST  32  /  ALT  42<H>  /  AlkPhos  59  09-      Urinalysis Basic - ( 26 Sep 2022 21:10 )    Color: Colorless / Appearance: Clear / S.008 / pH: x  Gluc: x / Ketone: Negative  / Bili: Negative / Urobili: <2 mg/dL   Blood: x / Protein: Negative / Nitrite: Negative   Leuk Esterase: Negative / RBC: NA /HPF / WBC NA /HPF   Sq Epi: x / Non Sq Epi: x / Bacteria: x        RADIOLOGY & ADDITIONAL TESTS:    Imaging Personally Reviewed:    Consultant(s) Notes Reviewed:      Care Discussed with Consultants/Other Providers:    Assessment and Plan:

## 2022-09-28 NOTE — PROGRESS NOTE ADULT - PROBLEM SELECTOR PLAN 4
- recent diagnosis of CAP @ [facility] on 9/22  - being treated with Augmentin, Azithromycin, Prednisone   - As per [facility] d/c paper work WBC 13.05 on 9/22  - Currently 11.88 (resolving)  - continue to monitor with CBC  - d/c Augmentin given has already completed >5 days for CAP

## 2022-09-28 NOTE — PROGRESS NOTE ADULT - ASSESSMENT
72y Male PMHx HTN, HLD, DM2, asthma presenting to [facility] ED for syncopal event and flank pain, now admitted to medicine for eval of left kidney mass and syncopal event.

## 2022-09-28 NOTE — PROGRESS NOTE ADULT - PROBLEM SELECTOR PLAN 7
- Prophylaxis: Lovenox 40 -CT with 3.8 cm dilated abdominal descending aorta  -asymptomatic, conservative management and close monitoring

## 2022-09-29 ENCOUNTER — TRANSCRIPTION ENCOUNTER (OUTPATIENT)
Age: 72
End: 2022-09-29

## 2022-09-29 LAB
ANION GAP SERPL CALC-SCNC: 8 MMOL/L — SIGNIFICANT CHANGE UP (ref 7–14)
ANION GAP SERPL CALC-SCNC: 9 MMOL/L — SIGNIFICANT CHANGE UP (ref 7–14)
BUN SERPL-MCNC: 10 MG/DL — SIGNIFICANT CHANGE UP (ref 7–23)
BUN SERPL-MCNC: 12 MG/DL — SIGNIFICANT CHANGE UP (ref 7–23)
CALCIUM SERPL-MCNC: 8.6 MG/DL — SIGNIFICANT CHANGE UP (ref 8.4–10.5)
CALCIUM SERPL-MCNC: 9.1 MG/DL — SIGNIFICANT CHANGE UP (ref 8.4–10.5)
CHLORIDE SERPL-SCNC: 95 MMOL/L — LOW (ref 98–107)
CHLORIDE SERPL-SCNC: 96 MMOL/L — LOW (ref 98–107)
CO2 SERPL-SCNC: 23 MMOL/L — SIGNIFICANT CHANGE UP (ref 22–31)
CO2 SERPL-SCNC: 26 MMOL/L — SIGNIFICANT CHANGE UP (ref 22–31)
CREAT SERPL-MCNC: 0.72 MG/DL — SIGNIFICANT CHANGE UP (ref 0.5–1.3)
CREAT SERPL-MCNC: 0.78 MG/DL — SIGNIFICANT CHANGE UP (ref 0.5–1.3)
EGFR: 96 ML/MIN/1.73M2 — SIGNIFICANT CHANGE UP
EGFR: 98 ML/MIN/1.73M2 — SIGNIFICANT CHANGE UP
GLUCOSE BLDC GLUCOMTR-MCNC: 135 MG/DL — HIGH (ref 70–99)
GLUCOSE BLDC GLUCOMTR-MCNC: 135 MG/DL — HIGH (ref 70–99)
GLUCOSE BLDC GLUCOMTR-MCNC: 169 MG/DL — HIGH (ref 70–99)
GLUCOSE BLDC GLUCOMTR-MCNC: 184 MG/DL — HIGH (ref 70–99)
GLUCOSE SERPL-MCNC: 122 MG/DL — HIGH (ref 70–99)
GLUCOSE SERPL-MCNC: 132 MG/DL — HIGH (ref 70–99)
HCT VFR BLD CALC: 33.6 % — LOW (ref 39–50)
HGB BLD-MCNC: 10.9 G/DL — LOW (ref 13–17)
MAGNESIUM SERPL-MCNC: 2.1 MG/DL — SIGNIFICANT CHANGE UP (ref 1.6–2.6)
MCHC RBC-ENTMCNC: 30.1 PG — SIGNIFICANT CHANGE UP (ref 27–34)
MCHC RBC-ENTMCNC: 32.4 GM/DL — SIGNIFICANT CHANGE UP (ref 32–36)
MCV RBC AUTO: 92.8 FL — SIGNIFICANT CHANGE UP (ref 80–100)
NRBC # BLD: 0 /100 WBCS — SIGNIFICANT CHANGE UP (ref 0–0)
NRBC # FLD: 0 K/UL — SIGNIFICANT CHANGE UP (ref 0–0)
OSMOLALITY UR: 320 MOSM/KG — SIGNIFICANT CHANGE UP (ref 50–1200)
PHOSPHATE SERPL-MCNC: 3.6 MG/DL — SIGNIFICANT CHANGE UP (ref 2.5–4.5)
PLATELET # BLD AUTO: 341 K/UL — SIGNIFICANT CHANGE UP (ref 150–400)
POTASSIUM SERPL-MCNC: 3.9 MMOL/L — SIGNIFICANT CHANGE UP (ref 3.5–5.3)
POTASSIUM SERPL-MCNC: 4.9 MMOL/L — SIGNIFICANT CHANGE UP (ref 3.5–5.3)
POTASSIUM SERPL-SCNC: 3.9 MMOL/L — SIGNIFICANT CHANGE UP (ref 3.5–5.3)
POTASSIUM SERPL-SCNC: 4.9 MMOL/L — SIGNIFICANT CHANGE UP (ref 3.5–5.3)
RBC # BLD: 3.62 M/UL — LOW (ref 4.2–5.8)
RBC # FLD: 14.3 % — SIGNIFICANT CHANGE UP (ref 10.3–14.5)
SODIUM SERPL-SCNC: 128 MMOL/L — LOW (ref 135–145)
SODIUM SERPL-SCNC: 129 MMOL/L — LOW (ref 135–145)
SODIUM UR-SCNC: 89 MMOL/L — SIGNIFICANT CHANGE UP
WBC # BLD: 11.12 K/UL — HIGH (ref 3.8–10.5)
WBC # FLD AUTO: 11.12 K/UL — HIGH (ref 3.8–10.5)

## 2022-09-29 PROCEDURE — 99233 SBSQ HOSP IP/OBS HIGH 50: CPT

## 2022-09-29 PROCEDURE — 93306 TTE W/DOPPLER COMPLETE: CPT | Mod: 26

## 2022-09-29 RX ORDER — PANTOPRAZOLE SODIUM 20 MG/1
1 TABLET, DELAYED RELEASE ORAL
Qty: 30 | Refills: 0
Start: 2022-09-29 | End: 2022-10-28

## 2022-09-29 RX ORDER — LOSARTAN POTASSIUM 100 MG/1
1 TABLET, FILM COATED ORAL
Qty: 30 | Refills: 0
Start: 2022-09-29 | End: 2022-10-28

## 2022-09-29 RX ORDER — PREGABALIN 225 MG/1
1 CAPSULE ORAL
Qty: 30 | Refills: 0
Start: 2022-09-29 | End: 2022-10-28

## 2022-09-29 RX ORDER — SODIUM CHLORIDE 9 MG/ML
1000 INJECTION INTRAMUSCULAR; INTRAVENOUS; SUBCUTANEOUS
Refills: 0 | Status: DISCONTINUED | OUTPATIENT
Start: 2022-09-29 | End: 2022-09-30

## 2022-09-29 RX ORDER — AZITHROMYCIN 500 MG/1
0 TABLET, FILM COATED ORAL
Qty: 0 | Refills: 0 | DISCHARGE

## 2022-09-29 RX ORDER — ALBUTEROL 90 UG/1
2 AEROSOL, METERED ORAL
Qty: 1 | Refills: 0
Start: 2022-09-29 | End: 2022-10-28

## 2022-09-29 RX ORDER — BUDESONIDE AND FORMOTEROL FUMARATE DIHYDRATE 160; 4.5 UG/1; UG/1
2 AEROSOL RESPIRATORY (INHALATION)
Qty: 1 | Refills: 0
Start: 2022-09-29 | End: 2022-10-28

## 2022-09-29 RX ORDER — MONTELUKAST 4 MG/1
1 TABLET, CHEWABLE ORAL
Qty: 30 | Refills: 0
Start: 2022-09-29 | End: 2022-10-28

## 2022-09-29 RX ADMIN — Medication 1: at 09:31

## 2022-09-29 RX ADMIN — PANTOPRAZOLE SODIUM 40 MILLIGRAM(S): 20 TABLET, DELAYED RELEASE ORAL at 05:28

## 2022-09-29 RX ADMIN — MONTELUKAST 10 MILLIGRAM(S): 4 TABLET, CHEWABLE ORAL at 11:09

## 2022-09-29 RX ADMIN — SODIUM CHLORIDE 75 MILLILITER(S): 9 INJECTION INTRAMUSCULAR; INTRAVENOUS; SUBCUTANEOUS at 09:53

## 2022-09-29 RX ADMIN — BUDESONIDE AND FORMOTEROL FUMARATE DIHYDRATE 2 PUFF(S): 160; 4.5 AEROSOL RESPIRATORY (INHALATION) at 09:52

## 2022-09-29 RX ADMIN — ENOXAPARIN SODIUM 40 MILLIGRAM(S): 100 INJECTION SUBCUTANEOUS at 05:28

## 2022-09-29 RX ADMIN — PREGABALIN 1000 MICROGRAM(S): 225 CAPSULE ORAL at 11:09

## 2022-09-29 RX ADMIN — LOSARTAN POTASSIUM 50 MILLIGRAM(S): 100 TABLET, FILM COATED ORAL at 11:09

## 2022-09-29 RX ADMIN — BUDESONIDE AND FORMOTEROL FUMARATE DIHYDRATE 2 PUFF(S): 160; 4.5 AEROSOL RESPIRATORY (INHALATION) at 22:30

## 2022-09-29 NOTE — PROGRESS NOTE ADULT - PROBLEM SELECTOR PLAN 3
-vitamin b12 167  -pt reports he is a vegetarian  -start supplementation -will hydrate with IVF with NS @75cc/hr  -f/u repeat BMP at 3pm

## 2022-09-29 NOTE — DISCHARGE NOTE PROVIDER - NSDCMRMEDTOKEN_GEN_ALL_CORE_FT
albuterol 90 mcg/inh inhalation aerosol: 2 puff(s) inhaled every 6 hours, As needed, Shortness of Breath and/or Wheezing  budesonide-formoterol 160 mcg-4.5 mcg/inh inhalation aerosol: 2 puff(s) inhaled 2 times a day   cyanocobalamin 1000 mcg oral tablet: 1 tab(s) orally once a day  losartan 50 mg oral tablet: 1 tab(s) orally once a day  montelukast 10 mg oral tablet: 1 tab(s) orally once a day  pantoprazole 40 mg oral delayed release tablet: 1 tab(s) orally once a day (before a meal)

## 2022-09-29 NOTE — PROGRESS NOTE ADULT - PROBLEM SELECTOR PLAN 5
- Hold home oral hypoglycemics  - iSS and FS  -A1c 6.7 - recent diagnosis of CAP @ [facility] on 9/22  - being treated with Augmentin, Azithromycin, Prednisone   - As per [facility] d/c paper work WBC 13.05 on 9/22  - Currently 11.88 (resolving)  - continue to monitor with CBC  - d/c Augmentin given has already completed >5 days for CAP  -f/u CBC at 3pm today

## 2022-09-29 NOTE — PROGRESS NOTE ADULT - PROBLEM SELECTOR PLAN 7
-CT with 3.8 cm dilated abdominal descending aorta  -asymptomatic, conservative management and close monitoring - For now can start losartan 50  - Dash diet

## 2022-09-29 NOTE — PROGRESS NOTE ADULT - PROBLEM SELECTOR PLAN 4
- recent diagnosis of CAP @ [facility] on 9/22  - being treated with Augmentin, Azithromycin, Prednisone   - As per [facility] d/c paper work WBC 13.05 on 9/22  - Currently 11.88 (resolving)  - continue to monitor with CBC  - d/c Augmentin given has already completed >5 days for CAP -vitamin b12 167  -pt reports he is a vegetarian  -start supplementation

## 2022-09-29 NOTE — DISCHARGE NOTE PROVIDER - HOSPITAL COURSE
72y Male PMHx HTN, HLD, DM2, asthma presenting to [facility] ED for syncopal event and flank pain, now admitted to medicine for eval of left kidney mass and syncopal event.     Syncope.   ·  Plan: - Patient with syncopal event with questionable duration of LOC that was preceded by SOB.   - EKG Non-ischemic appearing and troponin negative x2  - Patient without tachycardia or hypoxia making PE less likely   - Currently denies any chest pain, SOB or dizziness  - orthostatics resolved  - TTE pending.    Complex renal cyst.   ·  Plan: - CT A/P: 5cm Complex cystic lesion in the left lower pole of kidney, Mild aneurysmal dilation of the distal descending aorta above the hiatus   - Consulted urology recommendations appreciated - further evaluation can be completed as outpatient and should not delay discharge.   - Pain management: acetaminophen  - MRI Abdomen ordered and reviewed   - Follow up as outpatient on d/c.     Hyponatremia.  ·  Plan: -will hydrate with IVF with NS @75cc/hr  -f/u repeat BMP at 3pm.      Vitamin B12 deficiency.  ·  Plan: -vitamin b12 167  -pt reports he is a vegetarian  -start supplementation.      Leukocytosis.  ·  Plan: - recent diagnosis of CAP @ [facility] on 9/22  - being treated with Augmentin, Azithromycin, Prednisone   - [place] [facility] [place] WBC 13.05 on 9/22  - Currently 11.88 (resolving)  - continue to monitor with CBC  - d/c Augmentin given has already completed >5 days for CAP  -f/u CBC at 3pm today.       Type II diabetes mellitus.  ·  Plan: - Hold home oral hypoglycemics  - iSS and FS  -A1c 6.7.       Essential hypertension.  ·  Plan: - For now can start losartan 50  - Dash diet.    Abdominal aortic aneurysm (AAA).  ·  Plan: -CT with 3.8 cm dilated abdominal descending aorta  -asymptomatic, conservative management and close monitoring.       Need for prophylactic measure.   ·  Plan: - Prophylaxis: Lovenox 40   72y Male PMHx HTN, HLD, DM2, asthma presenting to [facility] ED for syncopal event and flank pain, now admitted to medicine for eval of left kidney mass and syncopal event.     Syncope.   ·  Plan: - Patient with syncopal event with questionable duration of LOC that was preceded by SOB.   - EKG Non-ischemic appearing and troponin negative x2  - Patient without tachycardia or hypoxia making PE less likely   - Currently denies any chest pain, SOB or dizziness  - orthostatics resolved  - TTE............................................    Complex renal cyst.   ·  Plan: - CT A/P: 5cm Complex cystic lesion in the left lower pole of kidney, Mild aneurysmal dilation of the distal descending aorta above the hiatus   - Consulted urology recommendations appreciated - further evaluation can be completed as outpatient and should not delay discharge.   - Pain management: acetaminophen  - MRI Abdomen: *  Left lower pole renal cyst measuring up to 4.8 cm, corresponding with   findings present on prior CT scan.  *  No evidence of enhancing renal mass.  *  Severe fatty infiltration of the liver.  *  Aneurysm of the distal descending thoracic aorta.  - Follow up as outpatient on d/c.     Hyponatremia.  ·  Plan: -will hydrate with IVF with NS @75cc/hr  -repeat sodium .................................      Vitamin B12 deficiency.  ·  Plan: -vitamin b12 167  -pt reports he is a vegetarian  -start supplementation.      Leukocytosis.  ·  Plan: - recent diagnosis of CAP @ [facility] on 9/22  - being treated with Augmentin, Azithromycin, Prednisone   - As per [facility] d/c paper work WBC 13.05 on 9/22  - Currently 11.88 (resolving)  - continue to monitor with CBC  - d/c Augmentin given has already completed >5 days for CAP       Type II diabetes mellitus.  ·  Plan: - Hold home oral hypoglycemics  - iSS and FS  -A1c 6.7.       Essential hypertension.  ·  Plan: - For now can start losartan 50  - Dash diet.    Abdominal aortic aneurysm (AAA).  ·  Plan: -CT with 3.8 cm dilated abdominal descending aorta  -asymptomatic, conservative management and close monitoring.       Need for prophylactic measure.   ·  Plan: - Prophylaxis: Lovenox 40      Patient medically cleared and stable for discharge home d/w Dr. Hairston on 9/29. Medications sent to a mutuallyu agreed upon phaacy 72y Male PMHx HTN, HLD, DM2, asthma presenting to [facility] ED for syncopal event and flank pain, now admitted to medicine for eval of left kidney mass and syncopal event.     Syncope.   ·  Plan: - Patient with syncopal event with questionable duration of LOC that was preceded by SOB.   - EKG Non-ischemic appearing and troponin negative x2  - Patient without tachycardia or hypoxia making PE less likely   - Currently denies any chest pain, SOB or dizziness  - orthostatics resolved  - TTE: 1. Aortic valve not well visualized; appears calcified.  2. Normal left ventricular systolic function. No segmental  wall motion abnormalities. Endocardial visualization  enhanced with intravenous injection of echo contrast  (Definity).  3. Normal right ventricular size and systolic function.    Complex renal cyst.   ·  Plan: - CT A/P: 5cm Complex cystic lesion in the left lower pole of kidney, Mild aneurysmal dilation of the distal descending aorta above the hiatus   - Consulted urology recommendations appreciated - further evaluation can be completed as outpatient and should not delay discharge.   - Pain management: acetaminophen  - MRI Abdomen: *  Left lower pole renal cyst measuring up to 4.8 cm, corresponding with   findings present on prior CT scan.  *  No evidence of enhancing renal mass.  *  Severe fatty infiltration of the liver.  *  Aneurysm of the distal descending thoracic aorta.  - Follow up as outpatient on d/c.     Hyponatremia.  ·  Plan: -will hydrate with IVF with NS @75cc/hr  -repeat sodium .................................      Vitamin B12 deficiency.  ·  Plan: -vitamin b12 167  -pt reports he is a vegetarian  -start supplementation.      Leukocytosis.  ·  Plan: - recent diagnosis of CAP @ [facility] on 9/22  - being treated with Augmentin, Azithromycin, Prednisone   - [place] [facility] [place] WBC 13.05 on 9/22  - Currently 11.88 (resolving)  - continue to monitor with CBC  - d/c Augmentin given has already completed >5 days for CAP       Type II diabetes mellitus.  ·  Plan: - Hold home oral hypoglycemics  - iSS and FS  -A1c 6.7.       Essential hypertension.  ·  Plan: - For now can start losartan 50  - Dash diet.    Abdominal aortic aneurysm (AAA).  ·  Plan: -CT with 3.8 cm dilated abdominal descending aorta  -asymptomatic, conservative management and close monitoring.       Need for prophylactic measure.   ·  Plan: - Prophylaxis: Lovenox 40      Patient medically cleared and stable for discharge home d/w Dr. Hairston on 9/29. Medications sent to a mutually agreed upon pharmacy. 72y Male PMHx HTN, HLD, DM2, asthma presenting to [facility] ED for syncopal event and flank pain, now admitted to medicine for eval of left kidney mass and syncopal event.     Syncope.   ·  Plan: - Patient with syncopal event with questionable duration of LOC that was preceded by SOB.   - EKG Non-ischemic appearing and troponin negative x2  - Patient without tachycardia or hypoxia making PE less likely   - Currently denies any chest pain, SOB or dizziness  - orthostatics resolved  - TTE: 1. Aortic valve not well visualized; appears calcified.  2. Normal left ventricular systolic function. No segmental  wall motion abnormalities. Endocardial visualization  enhanced with intravenous injection of echo contrast  (Definity).  3. Normal right ventricular size and systolic function.    Complex renal cyst.   ·  Plan: - CT A/P: 5cm Complex cystic lesion in the left lower pole of kidney, Mild aneurysmal dilation of the distal descending aorta above the hiatus   - Consulted urology recommendations appreciated - further evaluation can be completed as outpatient and should not delay discharge.   - Pain management: acetaminophen  - MRI Abdomen: *  Left lower pole renal cyst measuring up to 4.8 cm, corresponding with   findings present on prior CT scan.  *  No evidence of enhancing renal mass.  *  Severe fatty infiltration of the liver.  *  Aneurysm of the distal descending thoracic aorta.  - Follow up as outpatient on d/c.     Hyponatremia.  ·  Plan: -will hydrate with IVF with NS @75cc/hr  -repeat sodium 134, resolved       Vitamin B12 deficiency.  ·  Plan: -vitamin b12 167  -pt reports he is a vegetarian  -start supplementation.      Leukocytosis.  ·  Plan: - recent diagnosis of CAP @ [facility] on 9/22  - being treated with Augmentin, Azithromycin, Prednisone   - As per [facility] d/c paper work WBC 13.05 on 9/22  - Currently 11.88 (resolving)  - continue to monitor with CBC  - d/c Augmentin given has already completed >5 days for CAP       Type II diabetes mellitus.  ·  Plan: - Hold home oral hypoglycemics  - iSS and FS  -A1c 6.7.       Essential hypertension.  ·  Plan: - For now can start losartan 50  - Dash diet.    Abdominal aortic aneurysm (AAA).  ·  Plan: -CT with 3.8 cm dilated abdominal descending aorta  -asymptomatic, conservative management and close monitoring.       Need for prophylactic measure.   ·  Plan: - Prophylaxis: Lovenox 40      Patient medically cleared and stable for discharge home d/w Dr. Hairston on 9/30. Medications sent to a mutually agreed upon pharmacy.

## 2022-09-29 NOTE — PROGRESS NOTE ADULT - PROBLEM SELECTOR PLAN 9
- Prophylaxis: Lovenox 40    9. Dispo:   -see discharge summary in sunrise  -time spent discharging patients >39min  -d/c home once echo completed and labs repeated for today (and no issues) with outpatient urology f/u and appointment

## 2022-09-29 NOTE — DISCHARGE NOTE PROVIDER - CARE PROVIDERS DIRECT ADDRESSES
,[email or web address]

## 2022-09-29 NOTE — PROGRESS NOTE ADULT - ASSESSMENT
72y Male PMHx HTN, HLD, DM2, asthma presenting to [facility] ED for syncopal event and flank pain, now admitted to medicine for eval of left kidney mass and syncopal event.

## 2022-09-29 NOTE — PROGRESS NOTE ADULT - SUBJECTIVE AND OBJECTIVE BOX
[facility] Division of Hospital Medicine  Consuelo Hairston MD  Available via MS Teams  Pager: [phone]    SUBJECTIVE / OVERNIGHT EVENTS: Patient seen and examined by me. Patient denies any chest pain or SOB.    MEDICATIONS  (STANDING):  budesonide 160 MICROgram(s)/formoterol 4.5 MICROgram(s) Inhaler 2 Puff(s) Inhalation two times a day  cyanocobalamin 1000 MICROGram(s) Oral daily  dextrose 5%. 1000 milliLiter(s) (100 mL/Hr) IV Continuous <Continuous>  dextrose 5%. 1000 milliLiter(s) (50 mL/Hr) IV Continuous <Continuous>  dextrose 50% Injectable 25 Gram(s) IV Push once  dextrose 50% Injectable 12.5 Gram(s) IV Push once  dextrose 50% Injectable 25 Gram(s) IV Push once  enoxaparin Injectable 40 milliGRAM(s) SubCutaneous every 24 hours  glucagon  Injectable 1 milliGRAM(s) IntraMuscular once  influenza  Vaccine (HIGH DOSE) 0.7 milliLiter(s) IntraMuscular once  insulin lispro (ADMELOG) corrective regimen sliding scale   SubCutaneous three times a day before meals  insulin lispro (ADMELOG) corrective regimen sliding scale   SubCutaneous at bedtime  losartan 50 milliGRAM(s) Oral daily  montelukast 10 milliGRAM(s) Oral daily  pantoprazole    Tablet 40 milliGRAM(s) Oral before breakfast  sodium chloride 0.9%. 1000 milliLiter(s) (75 mL/Hr) IV Continuous <Continuous>    MEDICATIONS  (PRN):  acetaminophen     Tablet .. 650 milliGRAM(s) Oral every 6 hours PRN Temp greater or equal to 38C (100.4F), Mild Pain (1 - 3)  ALBUTerol    90 MICROgram(s) HFA Inhaler 2 Puff(s) Inhalation every 6 hours PRN Shortness of Breath and/or Wheezing  dextrose Oral Gel 15 Gram(s) Oral once PRN Blood Glucose LESS THAN 70 milliGRAM(s)/deciliter  melatonin 3 milliGRAM(s) Oral at bedtime PRN Insomnia    PHYSICAL EXAM:  Vital Signs Last 24 Hrs  T(C): 36.6 (29 Sep 2022 11:01), Max: 37 (28 Sep 2022 18:24)  T(F): 97.9 (29 Sep 2022 11:01), Max: 98.6 (28 Sep 2022 18:24)  HR: 84 (29 Sep 2022 11:01) (68 - 86)  BP: 132/56 (29 Sep 2022 11:01) (103/43 - 133/65)  BP(mean): --  RR: 16 (29 Sep 2022 11:01) (16 - 18)  SpO2: 97% (29 Sep 2022 11:01) (97% - 99%)    Parameters below as of 29 Sep 2022 11:01  Patient On (Oxygen Delivery Method): room air    GENERAL: NAD, well-developed  CHEST/LUNG: Clear to auscultation bilaterally; No wheeze  HEART: Regular rate and rhythm; No murmurs, rubs, or gallops  ABDOMEN: Soft, Nontender, Nondistended; Bowel sounds present  EXTREMITIES:  2+ Peripheral Pulses, No clubbing, cyanosis, or edema  PSYCH: AAOx3  NEUROLOGY: non-focal  SKIN: No rashes or lesions    LABS:                        10.9   10.79 )-----------( 373      ( 28 Sep 2022 06:00 )             34.9     09-29    128<L>  |  96<L>  |  10  ----------------------------<  132<H>  4.9   |  23  |  0.72    Ca    9.1      29 Sep 2022 07:10  Phos  3.6     09-29  Mg     2.10     09-29      Culture - Urine (collected 26 Sep 2022 21:51)  Source: Clean Catch Clean Catch (Midstream)  Final Report (28 Sep 2022 07:04):    No growth    RADIOLOGY & ADDITIONAL TESTS:  New Imaging Personally Reviewed Today: Yes  < from: MR Abdomen w/wo IV Cont (09.28.22 @ 21:47) >  IMPRESSION:  *  Left lower pole renal cyst measuring up to 4.8 cm, corresponding with   findings present on prior CT scan.  *  No evidence of enhancing renal mass.  *  Severe fatty infiltration of the liver.  *  Aneurysm of the distal descending thoracic aorta.  < end of copied text >    New Electrocardiogram Personally Reviewed Today:  Other Results Reviewed Today:   Prior or Outpatient Records Reviewed Today with Summary:    COORDINATION OF CARE:  Consultant Communication and Details of Discussion (where applicable):

## 2022-09-29 NOTE — PROGRESS NOTE ADULT - PROBLEM SELECTOR PLAN 8
- Prophylaxis: Lovenox 40    9. Dispo:   -see discharge summary in sunrise  -time spent discharging patients >39min  -d/c home once echo completed with outpatient urology f/u and appointment -CT with 3.8 cm dilated abdominal descending aorta  -asymptomatic, conservative management and close monitoring

## 2022-09-29 NOTE — DISCHARGE NOTE PROVIDER - NSDCFUADDAPPT_GEN_ALL_CORE_FT
F/u with urology as outpatient  [facility]    [address] [place] [address], [place] [address]  Phone: [phone]    F/u with urology as outpatient your information was given to the Urology office they will get in contact with your for an appointment   [facility]  [address] [place] [address], [place] [address]  Phone: [phone]    Follow up with urology. Your information was given to the Urology office they will get in contact with your for an appointment, but if you miss their call, you may contact them at the following:   [facility]  [address] [place] [address], [place] [address]  Phone: [phone]

## 2022-09-29 NOTE — DISCHARGE NOTE PROVIDER - PROVIDER TOKENS
PROVIDER:[TOKEN:[[id number]]] PROVIDER:[TOKEN:[[id number]]],FREE:[LAST:[Your],FIRST:[Primary Care],PHONE:[(   )    -],FAX:[(   )    -]]

## 2022-09-29 NOTE — DISCHARGE NOTE PROVIDER - CARE PROVIDER_API CALL
Doris Arias)  Urology  [address] [place] [address]  Phone: [phone]  Fax: [phone]  Follow Up Time:    Doris Arias)  Urology  [address] [place] [address], [place] [address]  Phone: [phone]  Fax: [phone]  Follow Up Time:     Your, Primary Care  Phone: (   )    -  Fax: (   )    -  Follow Up Time:

## 2022-09-29 NOTE — DISCHARGE NOTE PROVIDER - NSDCCPCAREPLAN_GEN_ALL_CORE_FT
PRINCIPAL DISCHARGE DIAGNOSIS  Diagnosis: Near syncope  Assessment and Plan of Treatment: you were brought into the hospital for a possble loss of copnsciousness, your cardiac work up was negative for a heart attack, and your breathing was normal      SECONDARY DISCHARGE DIAGNOSES  Diagnosis: Leukocytosis  Assessment and Plan of Treatment: Your white blood cell count was elevated but it was likley secondary to your recent diagnosis of pneumonia you were beign treated with Augmentin, Azithromycin, Prednisone    Diagnosis: Hyponatremia  Assessment and Plan of Treatment: Your sodium level was low,  you were given fluids through your vein and you sodium levels improved, please maintain yourself well hydrated.    Diagnosis: Essential hypertension  Assessment and Plan of Treatment: Low sodium and fat diet, continue anti-hypertensive medications, you were started on losartan to help control your blood pressure.      Diagnosis: Renal mass  Assessment and Plan of Treatment: You were found to have a cyst on your kidney for which you underwent an MRI, Urology saw you while you were in the hospital and you will need further follow up as outpatient     PRINCIPAL DISCHARGE DIAGNOSIS  Diagnosis: Near syncope  Assessment and Plan of Treatment: You were brought into the hospital for a possble loss of consciousness, your cardiac work up was negative for a heart attack, and your breathing was normal. Follow up with your primary care provider within 1-2 weeks after discharge.      SECONDARY DISCHARGE DIAGNOSES  Diagnosis: Renal mass  Assessment and Plan of Treatment: You were found to have a cyst on your kidney for which you underwent an MRI, Urology saw you while you were in the hospital and you will need further follow up as outpatient.    Diagnosis: Leukocytosis  Assessment and Plan of Treatment: Your white blood cell count was elevated but it was likley secondary to your recent diagnosis of pneumonia you were beign treated with Augmentin, Azithromycin, Prednisone.    Diagnosis: Hyponatremia  Assessment and Plan of Treatment: Your sodium level was low,  you were given fluids through your vein and you sodium levels improved, please maintain yourself well hydrated. Please follow up with your primary care provider for further evaluation and monitoring.    Diagnosis: Essential hypertension  Assessment and Plan of Treatment: Low sodium and fat diet, continue anti-hypertensive medications, you were started on losartan to help control your blood pressure.      Diagnosis: Type II diabetes mellitus  Assessment and Plan of Treatment: Your Hemoglobin A1C is  6.7. Target goal for hemoglobin A1C is <7. Monitor blood glucose levels throughout the day before meals and at bedtime. Record blood sugars and bring to outpatient providers appointment in order to be reviewed by your doctor for management modifications. If your sugars are more than 400 or less than 70 you should contact your PCP immediately. Monitor for signs/symptoms of low blood glucose, such as, dizziness, altered mental status, or cool/clammy skin. In addition, monitor for signs/symptoms of high blood glucose, such as, feeling hot, dry, fatigued, or with increased thirst/urination. Make regular podiatry appointments in order to have feet checked for wounds and uncontrolled toe nail growth to prevent infections, as well as, appointments with an ophthalmologist to monitor your vision.

## 2022-09-29 NOTE — DISCHARGE NOTE PROVIDER - NSFOLLOWUPCLINICS_GEN_ALL_ED_FT
[facility]  Internal Medicine  [address] [place] [address]  Phone: [phone]  Fax: [phone]

## 2022-09-29 NOTE — CHART NOTE - NSCHARTNOTEFT_GEN_A_CORE
I met with Cuca Sun at the request of Dr. SONI Flores to recheck her blood pressure.  Blood pressure medications on the MAR were reviewed with patient.    Patient has taken all medications as per usual regimen: Yes  Patient reports tolerating them without any issues or concerns: Yes    Vitals:    01/04/19 1611   BP: (!) 128/96   Patient Site: Right Arm   Patient Position: Sitting   Cuff Size: Adult Regular   Pulse: 84       After 5 minutes, the patient's blood pressure remained greater than or equal to 140/90.    Is the patient currently having any chest pain?  No  Does the patient currently have a headache?   No  Does the patient currently have any vision changes? No  Does the patient currently have any nausea? No  Does the patient currently have any abdominal pain? No    The previous encounter was reviewed.  The patient was discharged and the note will be sent to the provider for final review.  Pt agreeable.             MRI reviewed. Consistent w/ prior seen simple cyst. No other enhancing masses or lesions seen.   < from: MR Abdomen w/wo IV Cont (09.28.22 @ 21:47) >      ACC: [id number] EXAM:  MR ABDOMEN WAW IC                          PROCEDURE DATE:  09/28/2022          INTERPRETATION:  CLINICAL INFORMATION: Renal mass.    COMPARISON: CT scan 9/26/2022    CONTRAST/COMPLICATIONS:  IV Contrast: Gadavist  7.0 cc administered   0.5 cc discarded  Oral Contrast: NONE  Complications: None reported at time of study completion    PROCEDURE:  MRI of the abdomen was performed.    FINDINGS:  LOWER CHEST: Within normal limits.    LIVER: Fatty infiltration of the liver. Portal venous shunt in segment V.  BILE DUCTS: Normal caliber.  GALLBLADDER: Within normal limits.  SPLEEN: Within normal limits.  PANCREAS: Within normal limits.  ADRENALS: Within normal limits.  KIDNEYS/URETERS: Left lower pole renal cyst measuring 4.0 x 3.7 x 4.8 cm,   corresponding with lesion present on prior CT scan.    VISUALIZED PORTIONS:  BOWEL: Diverticulosis.  PERITONEUM: No ascites.  VESSELS: 3.4 cm distal descending thoracic aortic aneurysm.  RETROPERITONEUM/LYMPH NODES: No lymphadenopathy.  ABDOMINAL WALL: Within normal limits.  BONES: Within normal limits.    IMPRESSION:  *  Left lower pole renal cyst measuring up to 4.8 cm, corresponding with   findings present on prior CT scan.  *  No evidence of enhancing renal mass.  *  Severe fatty infiltration of the liver.  *  Aneurysm of the distal descending thoracic aorta.        --- End of Report ---      --No further evaluation from urology inpatient  --Can dispo per medical team  --F/u with urology as outpatient  [facility]    [address] [place] [address], [place] [address]  Phone: [phone]       b

## 2022-09-30 ENCOUNTER — TRANSCRIPTION ENCOUNTER (OUTPATIENT)
Age: 72
End: 2022-09-30

## 2022-09-30 VITALS
HEART RATE: 97 BPM | TEMPERATURE: 98 F | DIASTOLIC BLOOD PRESSURE: 74 MMHG | OXYGEN SATURATION: 100 % | SYSTOLIC BLOOD PRESSURE: 155 MMHG | RESPIRATION RATE: 18 BRPM

## 2022-09-30 LAB
ANION GAP SERPL CALC-SCNC: 9 MMOL/L — SIGNIFICANT CHANGE UP (ref 7–14)
BUN SERPL-MCNC: 10 MG/DL — SIGNIFICANT CHANGE UP (ref 7–23)
CALCIUM SERPL-MCNC: 9 MG/DL — SIGNIFICANT CHANGE UP (ref 8.4–10.5)
CHLORIDE SERPL-SCNC: 100 MMOL/L — SIGNIFICANT CHANGE UP (ref 98–107)
CO2 SERPL-SCNC: 25 MMOL/L — SIGNIFICANT CHANGE UP (ref 22–31)
CREAT SERPL-MCNC: 0.71 MG/DL — SIGNIFICANT CHANGE UP (ref 0.5–1.3)
EGFR: 99 ML/MIN/1.73M2 — SIGNIFICANT CHANGE UP
GLUCOSE BLDC GLUCOMTR-MCNC: 131 MG/DL — HIGH (ref 70–99)
GLUCOSE BLDC GLUCOMTR-MCNC: 164 MG/DL — HIGH (ref 70–99)
GLUCOSE SERPL-MCNC: 119 MG/DL — HIGH (ref 70–99)
HCT VFR BLD CALC: 34.5 % — LOW (ref 39–50)
HGB BLD-MCNC: 10.9 G/DL — LOW (ref 13–17)
MAGNESIUM SERPL-MCNC: 2 MG/DL — SIGNIFICANT CHANGE UP (ref 1.6–2.6)
MCHC RBC-ENTMCNC: 30.1 PG — SIGNIFICANT CHANGE UP (ref 27–34)
MCHC RBC-ENTMCNC: 31.6 GM/DL — LOW (ref 32–36)
MCV RBC AUTO: 95.3 FL — SIGNIFICANT CHANGE UP (ref 80–100)
NRBC # BLD: 0 /100 WBCS — SIGNIFICANT CHANGE UP (ref 0–0)
NRBC # FLD: 0 K/UL — SIGNIFICANT CHANGE UP (ref 0–0)
PHOSPHATE SERPL-MCNC: 2.6 MG/DL — SIGNIFICANT CHANGE UP (ref 2.5–4.5)
PLATELET # BLD AUTO: 369 K/UL — SIGNIFICANT CHANGE UP (ref 150–400)
POTASSIUM SERPL-MCNC: 4.5 MMOL/L — SIGNIFICANT CHANGE UP (ref 3.5–5.3)
POTASSIUM SERPL-SCNC: 4.5 MMOL/L — SIGNIFICANT CHANGE UP (ref 3.5–5.3)
RBC # BLD: 3.62 M/UL — LOW (ref 4.2–5.8)
RBC # FLD: 14.4 % — SIGNIFICANT CHANGE UP (ref 10.3–14.5)
SODIUM SERPL-SCNC: 134 MMOL/L — LOW (ref 135–145)
WBC # BLD: 11.56 K/UL — HIGH (ref 3.8–10.5)
WBC # FLD AUTO: 11.56 K/UL — HIGH (ref 3.8–10.5)

## 2022-09-30 PROCEDURE — 99239 HOSP IP/OBS DSCHRG MGMT >30: CPT

## 2022-09-30 RX ADMIN — SODIUM CHLORIDE 75 MILLILITER(S): 9 INJECTION INTRAMUSCULAR; INTRAVENOUS; SUBCUTANEOUS at 05:51

## 2022-09-30 RX ADMIN — Medication 1: at 12:47

## 2022-09-30 RX ADMIN — LOSARTAN POTASSIUM 50 MILLIGRAM(S): 100 TABLET, FILM COATED ORAL at 05:52

## 2022-09-30 RX ADMIN — BUDESONIDE AND FORMOTEROL FUMARATE DIHYDRATE 2 PUFF(S): 160; 4.5 AEROSOL RESPIRATORY (INHALATION) at 12:06

## 2022-09-30 RX ADMIN — MONTELUKAST 10 MILLIGRAM(S): 4 TABLET, CHEWABLE ORAL at 12:07

## 2022-09-30 RX ADMIN — PREGABALIN 1000 MICROGRAM(S): 225 CAPSULE ORAL at 11:56

## 2022-09-30 RX ADMIN — PANTOPRAZOLE SODIUM 40 MILLIGRAM(S): 20 TABLET, DELAYED RELEASE ORAL at 05:52

## 2022-09-30 RX ADMIN — ENOXAPARIN SODIUM 40 MILLIGRAM(S): 100 INJECTION SUBCUTANEOUS at 05:52

## 2022-09-30 NOTE — DISCHARGE NOTE NURSING/CASE MANAGEMENT/SOCIAL WORK - NSDCPEFALRISK_GEN_ALL_CORE
For information on Fall & Injury Prevention, visit: [email or web address] OR  [email or web address] OR  https://www.cdc.gov/steadi/patient.html

## 2022-09-30 NOTE — PROGRESS NOTE ADULT - PROBLEM SELECTOR PLAN 8
-CT with 3.8 cm dilated abdominal descending aorta  -asymptomatic, conservative management and close monitoring

## 2022-09-30 NOTE — PROGRESS NOTE ADULT - PROBLEM SELECTOR PLAN 2
- CT A/P: 5cm Complex cystic lesion in the left lower pole of kidney, Mild aneurysmal dilation of the distal descending aorta above the hiatus   - Consulted urology recommendations appreciated - further evaluation can be completed as outpatient and should not delay discharge.   - Pain management: acetaminophen  - MRI Abdomen ordered and reviewed   - Follow up as outpatient on d/c
- CT A/P: 5cm Complex cystic lesion in the left lower pole of kidney, Mild aneurysmal dilation of the distal descending aorta above the hiatus   - Consulted urology recommendations appreciated - further evaluation can be completed as outpatient and should not delay discharge.   - Pain management: acetaminophen  - MRI Abdomen ordered (safety sheet and consent obtained and placed in chart)  - Follow up as outpatient on d/c
- CT A/P: 5cm Complex cystic lesion in the left lower pole of kidney, Mild aneurysmal dilation of the distal descending aorta above the hiatus   - Consulted urology recommendations appreciated - further evaluation can be completed as outpatient and should not delay discharge.   - Pain management: acetaminophen  - MRI Abdomen ordered and reviewed   - Follow up as outpatient on d/c

## 2022-09-30 NOTE — PROGRESS NOTE ADULT - PROBLEM SELECTOR PLAN 5
- recent diagnosis of CAP @ [facility] on 9/22  - being treated with Augmentin, Azithromycin, Prednisone   - As per [facility] d/c paper work WBC 13.05 on 9/22  - Currently 11.88 (resolving)  - continue to monitor with CBC  - d/c Augmentin given has already completed >5 days for CAP  - repeat CBC this morning - recent diagnosis of CAP @ [facility] on 9/22  - being treated with Augmentin, Azithromycin, Prednisone   - As per [facility] d/c paper work WBC 13.05 on 9/22  - Currently 11.88 (resolving)  - continue to monitor with CBC  - d/c Augmentin given has already completed >5 days for CAP  - repeat CBC this morning stable

## 2022-09-30 NOTE — DISCHARGE NOTE NURSING/CASE MANAGEMENT/SOCIAL WORK - PATIENT PORTAL LINK FT
You can access the FollowMyHealth Patient Portal offered by [facility] by registering at the following website: [email or web address]. By joining [employer]’s FollowMyHealth portal, you will also be able to view your health information using other applications (apps) compatible with our system.

## 2022-09-30 NOTE — PROGRESS NOTE ADULT - SUBJECTIVE AND OBJECTIVE BOX
[facility] Division of Hospital Medicine  Consuelo Hairston MD  Available via MS Teams  Pager: [phone]    SUBJECTIVE / OVERNIGHT EVENTS: Patient seen and examined by me. No issues overnight.    MEDICATIONS  (STANDING):  budesonide 160 MICROgram(s)/formoterol 4.5 MICROgram(s) Inhaler 2 Puff(s) Inhalation two times a day  cyanocobalamin 1000 MICROGram(s) Oral daily  dextrose 5%. 1000 milliLiter(s) (100 mL/Hr) IV Continuous <Continuous>  dextrose 5%. 1000 milliLiter(s) (50 mL/Hr) IV Continuous <Continuous>  dextrose 50% Injectable 25 Gram(s) IV Push once  dextrose 50% Injectable 12.5 Gram(s) IV Push once  dextrose 50% Injectable 25 Gram(s) IV Push once  enoxaparin Injectable 40 milliGRAM(s) SubCutaneous every 24 hours  glucagon  Injectable 1 milliGRAM(s) IntraMuscular once  influenza  Vaccine (HIGH DOSE) 0.7 milliLiter(s) IntraMuscular once  insulin lispro (ADMELOG) corrective regimen sliding scale   SubCutaneous three times a day before meals  insulin lispro (ADMELOG) corrective regimen sliding scale   SubCutaneous at bedtime  losartan 50 milliGRAM(s) Oral daily  montelukast 10 milliGRAM(s) Oral daily  pantoprazole    Tablet 40 milliGRAM(s) Oral before breakfast  sodium chloride 0.9%. 1000 milliLiter(s) (75 mL/Hr) IV Continuous <Continuous>    MEDICATIONS  (PRN):  acetaminophen     Tablet .. 650 milliGRAM(s) Oral every 6 hours PRN Temp greater or equal to 38C (100.4F), Mild Pain (1 - 3)  ALBUTerol    90 MICROgram(s) HFA Inhaler 2 Puff(s) Inhalation every 6 hours PRN Shortness of Breath and/or Wheezing  dextrose Oral Gel 15 Gram(s) Oral once PRN Blood Glucose LESS THAN 70 milliGRAM(s)/deciliter  melatonin 3 milliGRAM(s) Oral at bedtime PRN Insomnia      I&O's Summary    29 Sep 2022 07:01  -  30 Sep 2022 07:00  --------------------------------------------------------  IN: 0 mL / OUT: 700 mL / NET: -700 mL        PHYSICAL EXAM:  Vital Signs Last 24 Hrs  T(C): 36.6 (30 Sep 2022 05:54), Max: 36.7 (29 Sep 2022 22:00)  T(F): 97.8 (30 Sep 2022 05:54), Max: 98 (29 Sep 2022 22:00)  HR: 81 (30 Sep 2022 05:54) (81 - 84)  BP: 124/72 (30 Sep 2022 05:54) (113/65 - 132/56)  BP(mean): --  RR: 18 (30 Sep 2022 05:54) (16 - 18)  SpO2: 98% (30 Sep 2022 05:54) (97% - 98%)    Parameters below as of 30 Sep 2022 05:54  Patient On (Oxygen Delivery Method): room air    GENERAL: NAD, well-developed  CHEST/LUNG: Clear to auscultation bilaterally; No wheeze  HEART: Regular rate and rhythm; No murmurs, rubs, or gallops  ABDOMEN: Soft, Nontender, Nondistended; Bowel sounds present  EXTREMITIES:  2+ Peripheral Pulses, No clubbing, cyanosis, or edema  PSYCH: AAOx3  NEUROLOGY: non-focal  SKIN: No rashes or lesions      LABS:                      10.9   11.12 )-----------( 341      ( 29 Sep 2022 16:05 )             33.6     09-29    129<L>  |  95<L>  |  12  ----------------------------<  122<H>  3.9   |  26  |  0.78    Ca    8.6      29 Sep 2022 16:05  Phos  3.6     09-29  Mg     2.10     09-29    RADIOLOGY & ADDITIONAL TESTS:  New Imaging Personally Reviewed Today: Yes  < from: TTE with Doppler (w/Cont) (09.29.22 @ 09:28) >  CONCLUSIONS:  1. Aortic valve not well visualized; appears calcified.  2. Normal left ventricular systolic function. No segmental  wall motion abnormalities. Endocardial visualization  enhanced with intravenous injection of echo contrast  (Definity).  3. Normal right ventricular size and systolic function.    < end of copied text >    New Electrocardiogram Personally Reviewed Today:  Other Results Reviewed Today:   Prior or Outpatient Records Reviewed Today with Summary:    COORDINATION OF CARE:  Consultant Communication and Details of Discussion (where applicable):     [facility] Division of Hospital Medicine  Consuelo Hairston MD  Available via MS Teams  Pager: [phone]    SUBJECTIVE / OVERNIGHT EVENTS: Patient seen and examined by me. No issues overnight.    MEDICATIONS  (STANDING):  budesonide 160 MICROgram(s)/formoterol 4.5 MICROgram(s) Inhaler 2 Puff(s) Inhalation two times a day  cyanocobalamin 1000 MICROGram(s) Oral daily  dextrose 5%. 1000 milliLiter(s) (100 mL/Hr) IV Continuous <Continuous>  dextrose 5%. 1000 milliLiter(s) (50 mL/Hr) IV Continuous <Continuous>  dextrose 50% Injectable 25 Gram(s) IV Push once  dextrose 50% Injectable 12.5 Gram(s) IV Push once  dextrose 50% Injectable 25 Gram(s) IV Push once  enoxaparin Injectable 40 milliGRAM(s) SubCutaneous every 24 hours  glucagon  Injectable 1 milliGRAM(s) IntraMuscular once  influenza  Vaccine (HIGH DOSE) 0.7 milliLiter(s) IntraMuscular once  insulin lispro (ADMELOG) corrective regimen sliding scale   SubCutaneous three times a day before meals  insulin lispro (ADMELOG) corrective regimen sliding scale   SubCutaneous at bedtime  losartan 50 milliGRAM(s) Oral daily  montelukast 10 milliGRAM(s) Oral daily  pantoprazole    Tablet 40 milliGRAM(s) Oral before breakfast  sodium chloride 0.9%. 1000 milliLiter(s) (75 mL/Hr) IV Continuous <Continuous>    MEDICATIONS  (PRN):  acetaminophen     Tablet .. 650 milliGRAM(s) Oral every 6 hours PRN Temp greater or equal to 38C (100.4F), Mild Pain (1 - 3)  ALBUTerol    90 MICROgram(s) HFA Inhaler 2 Puff(s) Inhalation every 6 hours PRN Shortness of Breath and/or Wheezing  dextrose Oral Gel 15 Gram(s) Oral once PRN Blood Glucose LESS THAN 70 milliGRAM(s)/deciliter  melatonin 3 milliGRAM(s) Oral at bedtime PRN Insomnia      I&O's Summary    29 Sep 2022 07:01  -  30 Sep 2022 07:00  --------------------------------------------------------  IN: 0 mL / OUT: 700 mL / NET: -700 mL        PHYSICAL EXAM:  Vital Signs Last 24 Hrs  T(C): 36.6 (30 Sep 2022 05:54), Max: 36.7 (29 Sep 2022 22:00)  T(F): 97.8 (30 Sep 2022 05:54), Max: 98 (29 Sep 2022 22:00)  HR: 81 (30 Sep 2022 05:54) (81 - 84)  BP: 124/72 (30 Sep 2022 05:54) (113/65 - 132/56)  BP(mean): --  RR: 18 (30 Sep 2022 05:54) (16 - 18)  SpO2: 98% (30 Sep 2022 05:54) (97% - 98%)    Parameters below as of 30 Sep 2022 05:54  Patient On (Oxygen Delivery Method): room air    GENERAL: NAD, well-developed  CHEST/LUNG: Clear to auscultation bilaterally; No wheeze  HEART: Regular rate and rhythm; No murmurs, rubs, or gallops  ABDOMEN: Soft, Nontender, Nondistended; Bowel sounds present  EXTREMITIES:  2+ Peripheral Pulses, No clubbing, cyanosis, or edema  PSYCH: AAOx3  NEUROLOGY: non-focal  SKIN: No rashes or lesions      LABS:                         10.9                 134  | 25   | 10           11.56 >-----------< 369     ------------------------< 119                   34.5                 4.5  | 100  | 0.71                                         Ca 9.0   Mg 2.00  Ph 2.6        RADIOLOGY & ADDITIONAL TESTS:  New Imaging Personally Reviewed Today: Yes  < from: TTE with Doppler (w/Cont) (09.29.22 @ 09:28) >  CONCLUSIONS:  1. Aortic valve not well visualized; appears calcified.  2. Normal left ventricular systolic function. No segmental  wall motion abnormalities. Endocardial visualization  enhanced with intravenous injection of echo contrast  (Definity).  3. Normal right ventricular size and systolic function.    < end of copied text >    New Electrocardiogram Personally Reviewed Today:  Other Results Reviewed Today:   Prior or Outpatient Records Reviewed Today with Summary:    COORDINATION OF CARE:  Consultant Communication and Details of Discussion (where applicable):

## 2022-09-30 NOTE — PROGRESS NOTE ADULT - PROBLEM SELECTOR PLAN 9
- Prophylaxis: Lovenox 40    9. Dispo:   -see discharge summary in sunrise  -time spent discharging patients >39min  -d/c home once labs reviewed this morning (and no issues) with outpatient urology f/u and appointment - Prophylaxis: Lovenox 40    9. Dispo:   -see discharge summary in sunrise  -time spent discharging patients >39min  -d/c home once labs reviewed this morning (and no issues) with outpatient urology f/u and appointment    Case discussed at length with daughter Stephanie over the phone who is in agreement with plan described above.

## 2022-09-30 NOTE — PROGRESS NOTE ADULT - PROBLEM SELECTOR PLAN 1
- Patient with syncopal event with questionable duration of LOC that was preceded by SOB.   - EKG Non-ischemic appearing and troponin negative x2  - Patient without tachycardia or hypoxia making PE less likely   - Currently denies any chest pain, SOB or dizziness  - check orthostatics and TTE
- Patient with syncopal event with questionable duration of LOC that was preceded by SOB.   - EKG Non-ischemic appearing and troponin negative x2  - Patient without tachycardia or hypoxia making PE less likely   - Currently denies any chest pain, SOB or dizziness  - orthostatics resolved  - TTE reviewed
- Patient with syncopal event with questionable duration of LOC that was preceded by SOB.   - EKG Non-ischemic appearing and troponin negative x2  - Patient without tachycardia or hypoxia making PE less likely   - Currently denies any chest pain, SOB or dizziness  - orthostatics resolved  - TTE pending

## 2022-09-30 NOTE — PROGRESS NOTE ADULT - ASSESSMENT
72y Male PMHx HTN, HLD, DM2, asthma presenting to [facility] ED for syncopal event and flank pain, now admitted to medicine for eval of left kidney mass and syncopal event. 72M h/o HTN, HLD, DM2, asthma presenting to [facility] ED for syncopal event and flank pain, now admitted to medicine for eval of left kidney mass and syncopal event.

## 2022-09-30 NOTE — DISCHARGE NOTE NURSING/CASE MANAGEMENT/SOCIAL WORK - NSDCFUADDAPPT_GEN_ALL_CORE_FT
Follow up with urology. Your information was given to the Urology office they will get in contact with your for an appointment, but if you miss their call, you may contact them at the following:   [facility]  [address] [place] [address], [place] [address]  Phone: [phone]

## 2022-10-04 PROBLEM — J45.909 UNSPECIFIED ASTHMA, UNCOMPLICATED: Chronic | Status: ACTIVE | Noted: 2022-09-27

## 2022-10-04 PROBLEM — E78.5 HYPERLIPIDEMIA, UNSPECIFIED: Chronic | Status: ACTIVE | Noted: 2022-09-27

## 2022-10-04 PROBLEM — I10 ESSENTIAL (PRIMARY) HYPERTENSION: Chronic | Status: ACTIVE | Noted: 2022-09-27

## 2022-10-04 PROBLEM — E11.9 TYPE 2 DIABETES MELLITUS WITHOUT COMPLICATIONS: Chronic | Status: ACTIVE | Noted: 2022-09-27

## 2022-10-06 PROBLEM — Z00.00 ENCOUNTER FOR PREVENTIVE HEALTH EXAMINATION: Status: ACTIVE | Noted: 2022-10-06

## 2022-10-10 ENCOUNTER — APPOINTMENT (OUTPATIENT)
Dept: INTERNAL MEDICINE | Facility: CLINIC | Age: 72
End: 2022-10-10

## 2022-10-11 ENCOUNTER — APPOINTMENT (OUTPATIENT)
Dept: UROLOGY | Facility: CLINIC | Age: 72
End: 2022-10-11

## 2022-10-11 VITALS
DIASTOLIC BLOOD PRESSURE: 73 MMHG | WEIGHT: 302.03 LBS | OXYGEN SATURATION: 98 % | TEMPERATURE: 97.3 F | HEART RATE: 100 BPM | HEIGHT: 65 IN | BODY MASS INDEX: 50.32 KG/M2 | SYSTOLIC BLOOD PRESSURE: 141 MMHG

## 2022-10-11 DIAGNOSIS — Z83.3 FAMILY HISTORY OF DIABETES MELLITUS: ICD-10-CM

## 2022-10-11 DIAGNOSIS — Z82.5 FAMILY HISTORY OF ASTHMA AND OTHER CHRONIC LOWER RESPIRATORY DISEASES: ICD-10-CM

## 2022-10-11 DIAGNOSIS — N28.1 CYST OF KIDNEY, ACQUIRED: ICD-10-CM

## 2022-10-11 DIAGNOSIS — Z86.39 PERSONAL HISTORY OF OTHER ENDOCRINE, NUTRITIONAL AND METABOLIC DISEASE: ICD-10-CM

## 2022-10-11 DIAGNOSIS — K21.9 GASTRO-ESOPHAGEAL REFLUX DISEASE W/OUT ESOPHAGITIS: ICD-10-CM

## 2022-10-11 DIAGNOSIS — E11.9 TYPE 2 DIABETES MELLITUS W/OUT COMPLICATIONS: ICD-10-CM

## 2022-10-11 DIAGNOSIS — Z87.09 PERSONAL HISTORY OF OTHER DISEASES OF THE RESPIRATORY SYSTEM: ICD-10-CM

## 2022-10-11 DIAGNOSIS — I10 ESSENTIAL (PRIMARY) HYPERTENSION: ICD-10-CM

## 2022-10-11 DIAGNOSIS — Z80.1 FAMILY HISTORY OF MALIGNANT NEOPLASM OF TRACHEA, BRONCHUS AND LUNG: ICD-10-CM

## 2022-10-11 DIAGNOSIS — Z87.891 PERSONAL HISTORY OF NICOTINE DEPENDENCE: ICD-10-CM

## 2022-10-11 DIAGNOSIS — J45.909 UNSPECIFIED ASTHMA, UNCOMPLICATED: ICD-10-CM

## 2022-10-11 PROCEDURE — 99203 OFFICE O/P NEW LOW 30 MIN: CPT

## 2022-10-11 RX ORDER — MONTELUKAST 10 MG/1
10 TABLET, FILM COATED ORAL
Refills: 0 | Status: ACTIVE | COMMUNITY
Start: 2022-09-29

## 2022-10-11 RX ORDER — METFORMIN HYDROCHLORIDE 850 MG/1
850 TABLET, COATED ORAL TWICE DAILY
Refills: 0 | Status: ACTIVE | COMMUNITY

## 2022-10-11 RX ORDER — AMOXICILLIN AND CLAVULANATE POTASSIUM 875; 125 MG/1; MG/1
875-125 TABLET, COATED ORAL
Qty: 20 | Refills: 0 | Status: ACTIVE | COMMUNITY
Start: 2022-09-22

## 2022-10-11 RX ORDER — PREDNISONE 20 MG/1
20 TABLET ORAL
Qty: 10 | Refills: 0 | Status: ACTIVE | COMMUNITY
Start: 2022-09-22

## 2022-10-11 RX ORDER — AZITHROMYCIN 250 MG/1
250 TABLET, FILM COATED ORAL
Qty: 6 | Refills: 0 | Status: ACTIVE | COMMUNITY
Start: 2022-09-22

## 2022-10-11 RX ORDER — LOSARTAN POTASSIUM 50 MG/1
50 TABLET, FILM COATED ORAL
Refills: 0 | Status: ACTIVE | COMMUNITY
Start: 2022-09-29

## 2022-10-11 RX ORDER — FLUTICASONE FUROATE AND VILANTEROL TRIFENATATE 200; 25 UG/1; UG/1
200-25 POWDER RESPIRATORY (INHALATION)
Refills: 0 | Status: ACTIVE | COMMUNITY
Start: 2022-10-07

## 2022-10-11 RX ORDER — ALBUTEROL SULFATE 90 UG/1
108 (90 BASE) INHALANT RESPIRATORY (INHALATION)
Refills: 0 | Status: ACTIVE | COMMUNITY
Start: 2022-09-29

## 2022-10-11 RX ORDER — PANTOPRAZOLE 40 MG/1
40 TABLET, DELAYED RELEASE ORAL
Refills: 0 | Status: ACTIVE | COMMUNITY
Start: 2022-09-29

## 2022-10-11 NOTE — ASSESSMENT
[FreeTextEntry1] : Mr. Botello presents for evaluation of 3.8 cm stage IIF L renal cyst\par Imaging was reviewed with the patient.\par He is currently asymptomatic with no associated symptoms \par \par Recommendations\par -observation \par -follow up CT in 6 mo\par -RTC in 6 mo for re-evaluation. Patient is welcome to return sooner if symptoms develop

## 2022-10-11 NOTE — PHYSICAL EXAM
[General Appearance - Well Developed] : well developed [General Appearance - Well Nourished] : well nourished [Abdomen Soft] : soft [Abdomen Tenderness] : non-tender [Abdomen Mass (___ Cm)] : no abdominal mass palpated

## 2022-10-11 NOTE — HISTORY OF PRESENT ILLNESS
[FreeTextEntry1] : 72M presents for initial evaluation of renal cyst \par \par \par PMH significant for: HTN, HLD, DM, Asthma \par PSH significant for: nothing \par Significant meds: metformin\par  \par Admitted to [facility] recently for syncopal event and flank pain. \par Cardiac w/u negative \par \par MRI (9/2022): Left lower pole renal cyst measuring 4.0 x 3.7 x 4.8 cm, no renal mass \par Imaging reviewed. Per my read, stage IIF given size. No calcifications or septation noted  \par \par Today patient states he feels well\par Denies any pain or urinary complaints

## 2022-10-14 ENCOUNTER — NON-APPOINTMENT (OUTPATIENT)
Age: 72
End: 2022-10-14

## 2023-05-11 ENCOUNTER — APPOINTMENT (OUTPATIENT)
Dept: UROLOGY | Facility: CLINIC | Age: 73
End: 2023-05-11
